# Patient Record
Sex: FEMALE | Race: WHITE | NOT HISPANIC OR LATINO
[De-identification: names, ages, dates, MRNs, and addresses within clinical notes are randomized per-mention and may not be internally consistent; named-entity substitution may affect disease eponyms.]

---

## 2017-05-17 ENCOUNTER — APPOINTMENT (OUTPATIENT)
Dept: MRI IMAGING | Facility: CLINIC | Age: 72
End: 2017-05-17

## 2017-05-17 ENCOUNTER — OUTPATIENT (OUTPATIENT)
Dept: OUTPATIENT SERVICES | Facility: HOSPITAL | Age: 72
LOS: 1 days | End: 2017-05-17
Payer: MEDICARE

## 2017-05-17 DIAGNOSIS — Z98.1 ARTHRODESIS STATUS: Chronic | ICD-10-CM

## 2017-05-17 DIAGNOSIS — Z98.89 OTHER SPECIFIED POSTPROCEDURAL STATES: Chronic | ICD-10-CM

## 2017-05-17 DIAGNOSIS — Z00.8 ENCOUNTER FOR OTHER GENERAL EXAMINATION: ICD-10-CM

## 2017-05-17 PROCEDURE — 73718 MRI LOWER EXTREMITY W/O DYE: CPT

## 2017-10-09 ENCOUNTER — RESULT REVIEW (OUTPATIENT)
Age: 72
End: 2017-10-09

## 2018-07-02 ENCOUNTER — APPOINTMENT (OUTPATIENT)
Dept: OBGYN | Facility: CLINIC | Age: 73
End: 2018-07-02
Payer: MEDICARE

## 2018-07-02 VITALS
WEIGHT: 133 LBS | BODY MASS INDEX: 22.83 KG/M2 | DIASTOLIC BLOOD PRESSURE: 93 MMHG | SYSTOLIC BLOOD PRESSURE: 172 MMHG | HEART RATE: 83 BPM

## 2018-07-02 DIAGNOSIS — N89.8 OTHER SPECIFIED NONINFLAMMATORY DISORDERS OF VAGINA: ICD-10-CM

## 2018-07-02 PROCEDURE — 51798 US URINE CAPACITY MEASURE: CPT

## 2018-07-02 PROCEDURE — 99214 OFFICE O/P EST MOD 30 MIN: CPT | Mod: 25

## 2018-07-02 RX ORDER — ESTRADIOL 0.1 MG/G
0.1 CREAM VAGINAL
Qty: 1 | Refills: 0 | Status: ACTIVE | COMMUNITY
Start: 2018-07-02 | End: 1900-01-01

## 2018-07-03 ENCOUNTER — APPOINTMENT (OUTPATIENT)
Dept: GASTROENTEROLOGY | Facility: CLINIC | Age: 73
End: 2018-07-03
Payer: MEDICARE

## 2018-07-03 VITALS
TEMPERATURE: 97.8 F | BODY MASS INDEX: 22.71 KG/M2 | SYSTOLIC BLOOD PRESSURE: 110 MMHG | HEART RATE: 77 BPM | HEIGHT: 64 IN | RESPIRATION RATE: 15 BRPM | OXYGEN SATURATION: 99 % | WEIGHT: 133 LBS | DIASTOLIC BLOOD PRESSURE: 70 MMHG

## 2018-07-03 DIAGNOSIS — R05 COUGH: ICD-10-CM

## 2018-07-03 DIAGNOSIS — Z12.11 ENCOUNTER FOR SCREENING FOR MALIGNANT NEOPLASM OF COLON: ICD-10-CM

## 2018-07-03 DIAGNOSIS — Z86.010 PERSONAL HISTORY OF COLONIC POLYPS: ICD-10-CM

## 2018-07-03 LAB
APPEARANCE: CLEAR
BACTERIA: NEGATIVE
BILIRUBIN URINE: NEGATIVE
BLOOD URINE: NEGATIVE
COLOR: YELLOW
GLUCOSE QUALITATIVE U: NEGATIVE MG/DL
HYALINE CASTS: 0 /LPF
KETONES URINE: NEGATIVE
LEUKOCYTE ESTERASE URINE: NEGATIVE
MICROSCOPIC-UA: NORMAL
NITRITE URINE: NEGATIVE
PH URINE: 6
PROTEIN URINE: NEGATIVE MG/DL
RED BLOOD CELLS URINE: 0 /HPF
SPECIFIC GRAVITY URINE: 1.01
SQUAMOUS EPITHELIAL CELLS: 1 /HPF
UROBILINOGEN URINE: NEGATIVE MG/DL
WHITE BLOOD CELLS URINE: 0 /HPF

## 2018-07-03 PROCEDURE — 99204 OFFICE O/P NEW MOD 45 MIN: CPT

## 2018-07-03 RX ORDER — POLYETHYLENE GLYCOL 3350, SODIUM SULFATE, SODIUM CHLORIDE, POTASSIUM CHLORIDE, ASCORBIC ACID, SODIUM ASCORBATE 7.5-2.691G
100 KIT ORAL
Qty: 1 | Refills: 0 | Status: ACTIVE | COMMUNITY
Start: 2018-07-03 | End: 1900-01-01

## 2018-07-04 LAB
BACTERIA UR CULT: NORMAL
CANDIDA VAG CYTO: NOT DETECTED
CORE LAB FLUID CYTOLOGY: NORMAL
G VAGINALIS+PREV SP MTYP VAG QL MICRO: NOT DETECTED
T VAGINALIS VAG QL WET PREP: NOT DETECTED

## 2018-07-05 ENCOUNTER — OTHER (OUTPATIENT)
Age: 73
End: 2018-07-05

## 2018-07-06 ENCOUNTER — OTHER (OUTPATIENT)
Age: 73
End: 2018-07-06

## 2018-07-06 RX ORDER — CALCIUM CITRATE/VITAMIN D3 250MG-5MCG
TABLET ORAL
Refills: 0 | Status: ACTIVE | COMMUNITY

## 2018-07-06 RX ORDER — ELECTROLYTES/DEXTROSE
SOLUTION, ORAL ORAL
Refills: 0 | Status: ACTIVE | COMMUNITY

## 2018-07-06 RX ORDER — BACLOFEN 10 MG/1
10 TABLET ORAL
Refills: 0 | Status: ACTIVE | COMMUNITY

## 2018-07-06 RX ORDER — BACILLUS COAGULANS/INULIN 1B-250 MG
CAPSULE ORAL
Refills: 0 | Status: ACTIVE | COMMUNITY

## 2018-07-06 RX ORDER — ASCORBIC ACID 125 MG
TABLET,CHEWABLE ORAL
Refills: 0 | Status: ACTIVE | COMMUNITY

## 2018-07-06 RX ORDER — PEPPERMINT OIL
OIL (ML) MISCELLANEOUS
Refills: 0 | Status: ACTIVE | COMMUNITY

## 2018-07-06 RX ORDER — PASSION FLOWER 250 MG
CAPSULE ORAL
Refills: 0 | Status: ACTIVE | COMMUNITY

## 2018-07-06 RX ORDER — CHROMIUM 200 MCG
TABLET ORAL
Refills: 0 | Status: ACTIVE | COMMUNITY

## 2018-07-17 ENCOUNTER — TRANSCRIPTION ENCOUNTER (OUTPATIENT)
Age: 73
End: 2018-07-17

## 2018-07-30 ENCOUNTER — APPOINTMENT (OUTPATIENT)
Dept: OBGYN | Facility: CLINIC | Age: 73
End: 2018-07-30
Payer: MEDICARE

## 2018-07-30 VITALS
BODY MASS INDEX: 22.53 KG/M2 | WEIGHT: 132 LBS | DIASTOLIC BLOOD PRESSURE: 80 MMHG | HEIGHT: 64 IN | HEART RATE: 81 BPM | SYSTOLIC BLOOD PRESSURE: 135 MMHG

## 2018-07-30 PROCEDURE — 99214 OFFICE O/P EST MOD 30 MIN: CPT

## 2018-07-31 LAB
APPEARANCE: CLEAR
BACTERIA: NEGATIVE
BILIRUBIN URINE: NEGATIVE
BLOOD URINE: NEGATIVE
COLOR: YELLOW
CORE LAB FLUID CYTOLOGY: NORMAL
ESTRADIOL SERPL-MCNC: <5 PG/ML
GLUCOSE QUALITATIVE U: NEGATIVE MG/DL
HYALINE CASTS: 1 /LPF
KETONES URINE: NEGATIVE
LEUKOCYTE ESTERASE URINE: NEGATIVE
MICROSCOPIC-UA: NORMAL
NITRITE URINE: NEGATIVE
PH URINE: 6.5
PROTEIN URINE: NEGATIVE MG/DL
RED BLOOD CELLS URINE: 1 /HPF
SPECIFIC GRAVITY URINE: 1.01
SQUAMOUS EPITHELIAL CELLS: 1 /HPF
UROBILINOGEN URINE: NEGATIVE MG/DL
WHITE BLOOD CELLS URINE: 0 /HPF

## 2018-08-01 LAB — BACTERIA UR CULT: NORMAL

## 2018-09-06 ENCOUNTER — APPOINTMENT (OUTPATIENT)
Dept: GASTROENTEROLOGY | Facility: HOSPITAL | Age: 73
End: 2018-09-06

## 2018-09-06 ENCOUNTER — OUTPATIENT (OUTPATIENT)
Dept: OUTPATIENT SERVICES | Facility: HOSPITAL | Age: 73
LOS: 1 days | Discharge: HOME | End: 2018-09-06
Payer: MEDICARE

## 2018-09-06 DIAGNOSIS — Z98.89 OTHER SPECIFIED POSTPROCEDURAL STATES: Chronic | ICD-10-CM

## 2018-09-06 DIAGNOSIS — Z98.1 ARTHRODESIS STATUS: Chronic | ICD-10-CM

## 2018-09-06 DIAGNOSIS — Z12.11 ENCOUNTER FOR SCREENING FOR MALIGNANT NEOPLASM OF COLON: ICD-10-CM

## 2018-09-06 PROCEDURE — 45378 DIAGNOSTIC COLONOSCOPY: CPT | Mod: GC

## 2019-02-04 ENCOUNTER — APPOINTMENT (OUTPATIENT)
Dept: OBGYN | Facility: CLINIC | Age: 74
End: 2019-02-04
Payer: MEDICARE

## 2019-02-04 VITALS
SYSTOLIC BLOOD PRESSURE: 146 MMHG | HEART RATE: 84 BPM | DIASTOLIC BLOOD PRESSURE: 77 MMHG | BODY MASS INDEX: 22.53 KG/M2 | HEIGHT: 64 IN | WEIGHT: 132 LBS

## 2019-02-04 PROCEDURE — 99214 OFFICE O/P EST MOD 30 MIN: CPT | Mod: 25

## 2019-02-04 PROCEDURE — 51798 US URINE CAPACITY MEASURE: CPT

## 2019-02-04 NOTE — COUNSELING
[FreeTextEntry1] : #1 vulvar burning/pain c/w friction marks from intercourse (if resolves with no intercourse til next visit -4-6 weeks-with estrace 1/4 gm 3x week at bedtime-if not will pursue further-to use on finger only not inserted-segundo next vist-also lubrication when resumes intercourse (2-3 weeks); cont vagifem-(mammo neg 2018 and breast sono) see dr bert whitley (br surgeon) and radiologist (butch) and d/cd by dr suárez (med onc)-diagnosed br ca 2010 and TL-will d/c estrace at next visit. Rxd vagifem by Dr. Hough and has not seen her in years-will inform gyn ostroff and breast mds as well and will get note from her breast MDS that vagifem/estrace is benefit>risk; will see dr whitley next week (breast md) and get note; also triple paste to vulva in am; benefit outweighs risk for pt re estrace cream small dose, short interval being rxd for severe vulvar burning-will change to otc once vulvar irritation is improved. lubrication for intercourse advised.-today 7/30/18 feels better and spoke to Dr. Bert Whitley Breast surgeon [facility] who states she had ER+ non-metastatic breast ca and she may take topical (Vagifem and estrace each 2x week) and estradiol level drawn today-I spoke to him directly and he willi send me an email approving this (2/4/19)  pt discharged from  Dr. Suárez med onc (9 y 5/19) ; vulva much improved: 2/4/19 no further burning feels well, takes vagifem 2 x week and approved by Dr. Whitley ([phone]) to take ; e2 <5 on vagifem repeated at next blood draw (at dr chaudhary-message sent to her)\par #2 gyn Dr. Neeru Barrera-up to date with exams seeing her again next year-RECORDS REQ AGAIN\par #3 thyroid nodule removed-benign; still has cough will ck with pcp (told gerd..?)will ck last cxr ; also sees pulmonary md at [facility]-dr eleazar covington-I did gyn exam and pap today \par #4 dexa last done 2017-osteoporosis--2.5 will ck in 2019 and decide if med-does exercise and takes d3 and calcium and will see Dr. Riojas bone mineral spec endo will see her aug 2018-not seeing any longer now dr chaudhary/\par #5 colo 2018 pending DR Henriquez normal no polyp (Polyp x 1 in past) rpt 2023 or per dr henriquez\par #6 sono pelvis ordered rx given\par #7 affirm (due to vag d/c) negative 2018\par #8 pcp twyla irwin all up to date\par #9 all ques ans to pt apparent satisfaction\par #10 may rx ptns for oab component of udfr/oz from pop; booked 2/22 x 4 weeks\par #11pap, hpv taken\par #12 taking 25 mcg synthroid w/dr chaudhary\par #13 bi annual 8/5/19 booked\par #14 has 'M' spike in blood and sees dr Knott q6 mos to ck heme/onc re marker for multiple myeloma\par

## 2019-02-04 NOTE — HISTORY OF PRESENT ILLNESS
[Cystocele (Obstetric)] : none [Uterine Prolapse] : none [Vaginal Wall Prolapse] : none [Rectal Prolapse] : none [Stress Incontinence] : none [Urge Incontinence Of Urine] : none [Unable To Restrain Bowel Movement] : none [x1] : once nightly [Urinary Stream Starts And Stops] : none [Incomplete Emptying Of Bladder] : none [Urinary Frequency] : none [Feelings Of Urinary Urgency] : none [Pain During Urination (Dysuria)] : none [Urinary Tract Infection] : rare [Hematuria] : none [Constipation Obstructed Defecation] : none [Incomplete Emptying Of Stool] : none [Stool Visible Blood] : none [Diarrhea] : none [Pelvic Pain] : frequent [Vaginal Pain] : rare [Vulvar Pain] : daily [Bladder Pain] : none [Rectal Pain] : rare [Back Pain] : rare [Sexual Dysfunction, NOS] : frequent [] : none

## 2019-02-04 NOTE — PHYSICAL EXAM
[No Acute Distress] : in no acute distress [Well developed] : well developed [Well Nourished] : ~L well nourished [Good Hygeine] : demonstrates good hygeine [Oriented x3] : oriented to person, place, and time [Normal Memory] : ~T memory was ~L unimpaired [Normal Mood/Affect] : mood and affect are normal [Normal Lung Sounds] : the lungs were clear to auscultation [Respirations regular] : ~T respiratory rate was regular [Rate & Rhythm Regular] : ~T heart rate and rhythm were normal [No Edema] : ~T edema was not present [Supple] : ~T the neck demonstrated no ~M decrease in suppleness [Thyroid Normal] : the thyroid ~T showed no abnormalities [Symmetrical] : the neck was ~L symmetrical [Warm and Dry] : was warm and dry to touch [Turgor Normal] : skin turgor ~T was normal [Normal Gait] : gait was normal [No Joint Swelling] : there was no joint swelling [No Clubbing, Cyanosis] : no clubbing or cyanosis of the fingernails [Normal Strength/Tone] : muscle strength and tone were normal [Vulvar Atrophy] : vulvar atrophy [Vulvar Scarring] : vulvar scarring [Labia Majora] : were normal [Labia Majora Erythema] : erythema [Labia Minora] : were normal [Labia Minora Erythema] : erythema [Bartholin's Gland] : both Bartholin's glands were normal  [Normal Appearance] : general appearance was normal [Atrophy] : atrophy [4] : 4 [] : I [Uterine Adnexae] : were not tender and not enlarged [Normal rectal exam] : was normal [Normal] : was normal [None] : no [Mass] : no breast mass [Tender] : no tenderness [Nipple Discharge] : no nipple discharge [Mass (___ Cm)] : no ~M [unfilled] abdominal mass was palpated [Tenderness] : ~T no ~M abdominal tenderness observed [H/Smegaly] : no hepatosplenomegaly [Supraclavicular LAD] : no adenopathy noted in supraclavicular lymph nodes [Axillary LAD] : no adenopathy was noted in axillary nodes [Inguinal LAD] : no adenopathy was noted in the inguinal lymph nodes [Rash/Lesion] : no rash or lesion was noted [Estrogen Effect] : no estrogen effect was observed

## 2019-02-04 NOTE — REVIEW OF SYSTEMS
[Eyesight Problems] : eyesight problems [Anxiety] : anxiety [All Other ROS] : all other reviewed systems are negative

## 2019-02-04 NOTE — CHIEF COMPLAINT
[Questionnaire Received] : Patient questionnaire received [Other ___] : [unfilled] [Bladder Pain] : bladder pain

## 2019-02-05 LAB
APPEARANCE: CLEAR
BACTERIA: NEGATIVE
BILIRUBIN URINE: NEGATIVE
BLOOD URINE: NEGATIVE
COLOR: YELLOW
GLUCOSE QUALITATIVE U: NEGATIVE MG/DL
KETONES URINE: NEGATIVE
LEUKOCYTE ESTERASE URINE: NEGATIVE
MICROSCOPIC-UA: NORMAL
NITRITE URINE: NEGATIVE
PH URINE: 6.5
PROTEIN URINE: NEGATIVE MG/DL
RED BLOOD CELLS URINE: 1 /HPF
SPECIFIC GRAVITY URINE: 1.01
SQUAMOUS EPITHELIAL CELLS: 0 /HPF
UROBILINOGEN URINE: NEGATIVE MG/DL
WHITE BLOOD CELLS URINE: 0 /HPF

## 2019-02-06 LAB
BACTERIA UR CULT: NORMAL
HPV HIGH+LOW RISK DNA PNL CVX: NOT DETECTED

## 2019-02-10 LAB — CYTOLOGY CVX/VAG DOC THIN PREP: NORMAL

## 2019-02-13 ENCOUNTER — OTHER (OUTPATIENT)
Age: 74
End: 2019-02-13

## 2019-02-22 ENCOUNTER — APPOINTMENT (OUTPATIENT)
Dept: OBGYN | Facility: CLINIC | Age: 74
End: 2019-02-22
Payer: MEDICARE

## 2019-02-22 VITALS
DIASTOLIC BLOOD PRESSURE: 77 MMHG | SYSTOLIC BLOOD PRESSURE: 146 MMHG | HEIGHT: 64 IN | HEART RATE: 72 BPM | WEIGHT: 133 LBS | BODY MASS INDEX: 22.71 KG/M2

## 2019-02-22 PROCEDURE — 99213 OFFICE O/P EST LOW 20 MIN: CPT | Mod: 25

## 2019-02-22 PROCEDURE — 64566 NEUROELTRD STIM POST TIBIAL: CPT

## 2019-03-01 ENCOUNTER — APPOINTMENT (OUTPATIENT)
Dept: OBGYN | Facility: CLINIC | Age: 74
End: 2019-03-01
Payer: MEDICARE

## 2019-03-01 VITALS
HEIGHT: 64 IN | BODY MASS INDEX: 22.88 KG/M2 | DIASTOLIC BLOOD PRESSURE: 73 MMHG | WEIGHT: 134 LBS | SYSTOLIC BLOOD PRESSURE: 121 MMHG | HEART RATE: 93 BPM

## 2019-03-01 PROCEDURE — 99214 OFFICE O/P EST MOD 30 MIN: CPT | Mod: 25

## 2019-03-01 PROCEDURE — 64566 NEUROELTRD STIM POST TIBIAL: CPT

## 2019-03-07 ENCOUNTER — APPOINTMENT (OUTPATIENT)
Dept: OPHTHALMOLOGY | Facility: CLINIC | Age: 74
End: 2019-03-07
Payer: MEDICARE

## 2019-03-07 DIAGNOSIS — H18.51 ENDOTHELIAL CORNEAL DYSTROPHY: ICD-10-CM

## 2019-03-07 DIAGNOSIS — H52.213 IRREGULAR ASTIGMATISM, BILATERAL: ICD-10-CM

## 2019-03-07 DIAGNOSIS — H53.141 VISUAL DISCOMFORT, RIGHT EYE: ICD-10-CM

## 2019-03-07 DIAGNOSIS — Z86.69 PERSONAL HISTORY OF OTHER DISEASES OF THE NERVOUS SYSTEM AND SENSE ORGANS: ICD-10-CM

## 2019-03-07 DIAGNOSIS — Z98.890 OTHER SPECIFIED POSTPROCEDURAL STATES: ICD-10-CM

## 2019-03-07 DIAGNOSIS — Z96.1 PRESENCE OF INTRAOCULAR LENS: ICD-10-CM

## 2019-03-07 DIAGNOSIS — H53.10 UNSPECIFIED SUBJECTIVE VISUAL DISTURBANCES: ICD-10-CM

## 2019-03-07 PROCEDURE — 76514 ECHO EXAM OF EYE THICKNESS: CPT

## 2019-03-07 PROCEDURE — 92025 CPTRIZED CORNEAL TOPOGRAPHY: CPT

## 2019-03-07 PROCEDURE — 92286 ANT SGM IMG I&R SPECLR MIC: CPT

## 2019-03-07 PROCEDURE — 92004 COMPRE OPH EXAM NEW PT 1/>: CPT

## 2019-03-08 ENCOUNTER — TRANSCRIPTION ENCOUNTER (OUTPATIENT)
Age: 74
End: 2019-03-08

## 2019-03-08 ENCOUNTER — APPOINTMENT (OUTPATIENT)
Dept: OBGYN | Facility: CLINIC | Age: 74
End: 2019-03-08
Payer: MEDICARE

## 2019-03-08 VITALS
HEART RATE: 94 BPM | SYSTOLIC BLOOD PRESSURE: 105 MMHG | DIASTOLIC BLOOD PRESSURE: 69 MMHG | WEIGHT: 134 LBS | BODY MASS INDEX: 22.88 KG/M2 | HEIGHT: 64 IN

## 2019-03-08 PROCEDURE — 64566 NEUROELTRD STIM POST TIBIAL: CPT

## 2019-03-08 PROCEDURE — 99214 OFFICE O/P EST MOD 30 MIN: CPT | Mod: 25

## 2019-03-11 ENCOUNTER — APPOINTMENT (OUTPATIENT)
Dept: OPHTHALMOLOGY | Facility: CLINIC | Age: 74
End: 2019-03-11
Payer: SELF-PAY

## 2019-03-11 PROCEDURE — 92015A: CUSTOM

## 2019-03-11 PROCEDURE — 92015 DETERMINE REFRACTIVE STATE: CPT

## 2019-03-15 ENCOUNTER — APPOINTMENT (OUTPATIENT)
Dept: OBGYN | Facility: CLINIC | Age: 74
End: 2019-03-15
Payer: MEDICARE

## 2019-03-15 VITALS
HEART RATE: 80 BPM | DIASTOLIC BLOOD PRESSURE: 74 MMHG | SYSTOLIC BLOOD PRESSURE: 114 MMHG | WEIGHT: 134 LBS | BODY MASS INDEX: 22.88 KG/M2 | HEIGHT: 64 IN

## 2019-03-15 PROCEDURE — 64566 NEUROELTRD STIM POST TIBIAL: CPT

## 2019-03-15 PROCEDURE — 99214 OFFICE O/P EST MOD 30 MIN: CPT | Mod: 25

## 2019-03-22 ENCOUNTER — APPOINTMENT (OUTPATIENT)
Dept: OBGYN | Facility: CLINIC | Age: 74
End: 2019-03-22
Payer: MEDICARE

## 2019-03-22 VITALS
HEIGHT: 64 IN | WEIGHT: 134 LBS | HEART RATE: 79 BPM | DIASTOLIC BLOOD PRESSURE: 84 MMHG | BODY MASS INDEX: 22.88 KG/M2 | SYSTOLIC BLOOD PRESSURE: 127 MMHG

## 2019-03-22 DIAGNOSIS — R30.0 DYSURIA: ICD-10-CM

## 2019-03-22 PROCEDURE — 64566 NEUROELTRD STIM POST TIBIAL: CPT

## 2019-03-22 PROCEDURE — 51798 US URINE CAPACITY MEASURE: CPT

## 2019-03-22 PROCEDURE — 99214 OFFICE O/P EST MOD 30 MIN: CPT | Mod: 25

## 2019-04-03 ENCOUNTER — APPOINTMENT (OUTPATIENT)
Dept: OPHTHALMOLOGY | Facility: CLINIC | Age: 74
End: 2019-04-03
Payer: SELF-PAY

## 2019-04-03 PROCEDURE — 92310C: CUSTOM

## 2019-04-05 ENCOUNTER — APPOINTMENT (OUTPATIENT)
Dept: OBGYN | Facility: CLINIC | Age: 74
End: 2019-04-05
Payer: MEDICARE

## 2019-04-05 VITALS
SYSTOLIC BLOOD PRESSURE: 143 MMHG | HEART RATE: 76 BPM | HEIGHT: 64 IN | BODY MASS INDEX: 23.39 KG/M2 | WEIGHT: 137 LBS | DIASTOLIC BLOOD PRESSURE: 79 MMHG

## 2019-04-05 PROCEDURE — 99214 OFFICE O/P EST MOD 30 MIN: CPT | Mod: 25

## 2019-04-05 PROCEDURE — 64566 NEUROELTRD STIM POST TIBIAL: CPT

## 2019-04-12 ENCOUNTER — APPOINTMENT (OUTPATIENT)
Dept: OBGYN | Facility: CLINIC | Age: 74
End: 2019-04-12
Payer: MEDICARE

## 2019-04-12 VITALS
HEIGHT: 64 IN | BODY MASS INDEX: 23.22 KG/M2 | SYSTOLIC BLOOD PRESSURE: 134 MMHG | DIASTOLIC BLOOD PRESSURE: 80 MMHG | WEIGHT: 136 LBS | HEART RATE: 73 BPM

## 2019-04-12 DIAGNOSIS — N94.89 OTHER SPECIFIED CONDITIONS ASSOCIATED WITH FEMALE GENITAL ORGANS AND MENSTRUAL CYCLE: ICD-10-CM

## 2019-04-12 PROCEDURE — 64566 NEUROELTRD STIM POST TIBIAL: CPT

## 2019-04-12 PROCEDURE — 51798 US URINE CAPACITY MEASURE: CPT

## 2019-04-12 PROCEDURE — 99214 OFFICE O/P EST MOD 30 MIN: CPT | Mod: 25

## 2019-04-19 ENCOUNTER — APPOINTMENT (OUTPATIENT)
Dept: OBGYN | Facility: CLINIC | Age: 74
End: 2019-04-19
Payer: MEDICARE

## 2019-04-19 VITALS
HEART RATE: 72 BPM | BODY MASS INDEX: 23.22 KG/M2 | SYSTOLIC BLOOD PRESSURE: 123 MMHG | HEIGHT: 64 IN | WEIGHT: 136 LBS | DIASTOLIC BLOOD PRESSURE: 77 MMHG

## 2019-04-19 PROCEDURE — 99214 OFFICE O/P EST MOD 30 MIN: CPT | Mod: 25

## 2019-04-19 PROCEDURE — 51798 US URINE CAPACITY MEASURE: CPT

## 2019-04-19 PROCEDURE — 64566 NEUROELTRD STIM POST TIBIAL: CPT

## 2019-04-26 ENCOUNTER — APPOINTMENT (OUTPATIENT)
Dept: OBGYN | Facility: CLINIC | Age: 74
End: 2019-04-26
Payer: MEDICARE

## 2019-04-26 VITALS
HEIGHT: 64 IN | DIASTOLIC BLOOD PRESSURE: 73 MMHG | SYSTOLIC BLOOD PRESSURE: 137 MMHG | BODY MASS INDEX: 22.88 KG/M2 | WEIGHT: 134 LBS | HEART RATE: 80 BPM

## 2019-04-26 PROCEDURE — 64566 NEUROELTRD STIM POST TIBIAL: CPT

## 2019-04-26 PROCEDURE — 51798 US URINE CAPACITY MEASURE: CPT

## 2019-04-26 PROCEDURE — 99214 OFFICE O/P EST MOD 30 MIN: CPT | Mod: 25

## 2019-04-26 RX ORDER — FLUTICASONE PROPIONATE 50 UG/1
50 SPRAY, METERED NASAL
Qty: 16 | Refills: 0 | Status: ACTIVE | COMMUNITY
Start: 2019-01-09

## 2019-04-26 RX ORDER — LEVOTHYROXINE SODIUM 0.05 MG/1
50 TABLET ORAL
Qty: 90 | Refills: 0 | Status: ACTIVE | COMMUNITY
Start: 2019-04-08

## 2019-04-26 RX ORDER — LEVOTHYROXINE SODIUM 25 UG/1
25 TABLET ORAL
Qty: 90 | Refills: 0 | Status: ACTIVE | COMMUNITY
Start: 2019-01-07

## 2019-04-26 RX ORDER — LOTEPREDNOL ETABONATE 5 MG/G
0.5 GEL OPHTHALMIC
Qty: 5 | Refills: 0 | Status: ACTIVE | COMMUNITY
Start: 2019-02-12

## 2019-04-30 ENCOUNTER — APPOINTMENT (OUTPATIENT)
Dept: OPHTHALMOLOGY | Facility: CLINIC | Age: 74
End: 2019-04-30
Payer: MEDICARE

## 2019-04-30 PROCEDURE — 92012 INTRM OPH EXAM EST PATIENT: CPT

## 2019-04-30 PROCEDURE — 92060 SENSORIMOTOR EXAMINATION: CPT

## 2019-05-03 ENCOUNTER — APPOINTMENT (OUTPATIENT)
Dept: OBGYN | Facility: CLINIC | Age: 74
End: 2019-05-03
Payer: MEDICARE

## 2019-05-03 VITALS
BODY MASS INDEX: 23.39 KG/M2 | DIASTOLIC BLOOD PRESSURE: 84 MMHG | SYSTOLIC BLOOD PRESSURE: 156 MMHG | HEIGHT: 64 IN | WEIGHT: 137 LBS | HEART RATE: 72 BPM

## 2019-05-03 PROCEDURE — 99213 OFFICE O/P EST LOW 20 MIN: CPT | Mod: 25

## 2019-05-03 PROCEDURE — 51798 US URINE CAPACITY MEASURE: CPT

## 2019-05-03 PROCEDURE — 64566 NEUROELTRD STIM POST TIBIAL: CPT

## 2019-05-16 ENCOUNTER — OTHER (OUTPATIENT)
Age: 74
End: 2019-05-16

## 2019-05-17 ENCOUNTER — APPOINTMENT (OUTPATIENT)
Dept: OBGYN | Facility: CLINIC | Age: 74
End: 2019-05-17
Payer: MEDICARE

## 2019-05-17 VITALS
HEART RATE: 90 BPM | HEIGHT: 64 IN | DIASTOLIC BLOOD PRESSURE: 79 MMHG | BODY MASS INDEX: 23.22 KG/M2 | SYSTOLIC BLOOD PRESSURE: 159 MMHG | WEIGHT: 136 LBS

## 2019-05-17 PROCEDURE — 64566 NEUROELTRD STIM POST TIBIAL: CPT

## 2019-05-24 ENCOUNTER — APPOINTMENT (OUTPATIENT)
Dept: OBGYN | Facility: CLINIC | Age: 74
End: 2019-05-24

## 2019-05-25 NOTE — HISTORY OF PRESENT ILLNESS
[Cystocele (Obstetric)] : none [Uterine Prolapse] : none [Vaginal Wall Prolapse] : none [Rectal Prolapse] : none [Stress Incontinence] : none [Urge Incontinence Of Urine] : none [Unable To Restrain Bowel Movement] : none [x1] : once nightly [Urinary Stream Starts And Stops] : none [Urinary Frequency] : none [Incomplete Emptying Of Bladder] : none [Feelings Of Urinary Urgency] : none [Pain During Urination (Dysuria)] : none [Urinary Tract Infection] : rare [Hematuria] : none [Constipation Obstructed Defecation] : none [Stool Visible Blood] : none [Incomplete Emptying Of Stool] : none [Pelvic Pain] : frequent [Diarrhea] : none [Vaginal Pain] : rare [Vulvar Pain] : daily [Rectal Pain] : rare [Back Pain] : rare [Bladder Pain] : none [Sexual Dysfunction, NOS] : frequent [] : none

## 2019-05-31 ENCOUNTER — APPOINTMENT (OUTPATIENT)
Dept: OBGYN | Facility: CLINIC | Age: 74
End: 2019-05-31
Payer: MEDICARE

## 2019-05-31 VITALS
WEIGHT: 136 LBS | DIASTOLIC BLOOD PRESSURE: 80 MMHG | HEIGHT: 64 IN | HEART RATE: 88 BPM | BODY MASS INDEX: 23.22 KG/M2 | SYSTOLIC BLOOD PRESSURE: 128 MMHG

## 2019-05-31 PROCEDURE — 99214 OFFICE O/P EST MOD 30 MIN: CPT | Mod: 25

## 2019-05-31 PROCEDURE — 51798 US URINE CAPACITY MEASURE: CPT

## 2019-05-31 PROCEDURE — 64566 NEUROELTRD STIM POST TIBIAL: CPT

## 2019-05-31 NOTE — PROCEDURE
[Unable to tolerate drug therapy] : because she is unable to tolerate drug therapy [Urgency] : urinary urgency [Frequency] : urinary frequency [Right Ankle] : right ankle [#12] : treatment #12 [Patient Tolerated Treatment] : patient tolerated treatment well [Appropriate Reflexes Elicited] : appropriate reflexes were elicited [Treatment cycle completed] : treatment cycle was completed [Adverse Reactions] : no adverse reactions, [FreeTextEntry1] : pt tolerated well rtc one month; sl < UUI; right ankle #4 then raised to #5

## 2019-05-31 NOTE — PHYSICAL EXAM
[Well developed] : well developed [No Acute Distress] : in no acute distress [Well Nourished] : ~L well nourished [Good Hygeine] : demonstrates good hygeine [Oriented x3] : oriented to person, place, and time [Normal Mood/Affect] : mood and affect are normal [Normal Memory] : ~T memory was ~L unimpaired [Respirations regular] : ~T respiratory rate was regular [Rate & Rhythm Regular] : ~T heart rate and rhythm were normal [Normal Lung Sounds] : the lungs were clear to auscultation [No Edema] : ~T edema was not present [Supple] : ~T the neck demonstrated no ~M decrease in suppleness [Thyroid Normal] : the thyroid ~T showed no abnormalities [Symmetrical] : the neck was ~L symmetrical [Warm and Dry] : was warm and dry to touch [Turgor Normal] : skin turgor ~T was normal [Normal Gait] : gait was normal [No Joint Swelling] : there was no joint swelling [No Clubbing, Cyanosis] : no clubbing or cyanosis of the fingernails [Normal Strength/Tone] : muscle strength and tone were normal [Vulvar Atrophy] : vulvar atrophy [Vulvar Scarring] : vulvar scarring [Labia Majora Erythema] : erythema [Labia Majora] : were normal [Labia Minora] : were normal [Labia Minora Erythema] : erythema [Bartholin's Gland] : both Bartholin's glands were normal  [Normal Appearance] : general appearance was normal [Atrophy] : atrophy [4] : 4 [] : I [Uterine Adnexae] : were not tender and not enlarged [Normal rectal exam] : was normal [Normal] : was normal [None] : no [Mass] : no breast mass [Tender] : no tenderness [Nipple Discharge] : no nipple discharge [Mass (___ Cm)] : no ~M [unfilled] abdominal mass was palpated [Tenderness] : ~T no ~M abdominal tenderness observed [H/Smegaly] : no hepatosplenomegaly [Supraclavicular LAD] : no adenopathy noted in supraclavicular lymph nodes [Axillary LAD] : no adenopathy was noted in axillary nodes [Inguinal LAD] : no adenopathy was noted in the inguinal lymph nodes [Rash/Lesion] : no rash or lesion was noted [Estrogen Effect] : no estrogen effect was observed

## 2019-05-31 NOTE — COUNSELING
[FreeTextEntry1] : #1 vulvar burning/pain c/w friction marks from intercourse (if resolves with no intercourse til next visit -4-6 weeks-with estrace 1/4 gm 3x week at bedtime-if not will pursue further-to use on finger only not inserted-segundo next vist-also lubrication when resumes intercourse (2-3 weeks); cont vagifem-(mammo neg 2018 and breast sono) see dr bert whitley (br surgeon) and radiologist (butch) and d/cd by dr suárez (med onc)-diagnosed br ca 2010 and TL-will d/c estrace at next visit. Rxd vagifem by Dr. Hough and has not seen her in years-will inform gyn ostroff and breast mds as well and will get note from her breast MDS that vagifem/estrace is benefit>risk; will see dr whitley next week (breast md) and get note; also triple paste to vulva in am; benefit outweighs risk for pt re estrace cream small dose, short interval being rxd for severe vulvar burning-will change to otc once vulvar irritation is improved. lubrication for intercourse advised.-today 7/30/18 feels better and spoke to Dr. Bert Whitley Breast surgeon [facility] who states she had ER+ non-metastatic breast ca and she may take topical (Vagifem and estrace each 2x week) and estradiol level drawn today-I spoke to him directly and he willi send me an email approving this (2/4/19)  pt discharged from  Dr. Suárez med onc (9 y 5/19) ; vulva much improved: 2/4/19 no further burning feels well, takes vagifem 2 x week and approved by Dr. Whitley ([id number]) to take ; e2 <5 on vagifem repeated at next blood draw (at dr chaudhary-message sent to her)-now only vagifem 2x week no estrace \par #2 gyn Dr. Neeru Barrera-up to date with exams seeing her again next year-RECORDS REQ AGAIN\par #3 thyroid nodule removed-benign; still has cough will ck with pcp (told gerd..?)will ck last cxr ; also sees pulmonary md at [facility]-dr peter dicpiniagatis-I did gyn exam and pap 2019 negative\par #4 dexa last done 2017-osteoporosis--2.5 will ck in 2019 and decide if med-does exercise and takes d3 and calcium and will see Dr. Riojas bone mineral spec endo will see her aug 2018-not seeing any longer now dr chaudhary/ is following\par #5 colo 2018 pending DR Henriquez normal no polyp (Polyp x 1 in past) rpt 2023 or per dr henriquez\par #6 sono pelvis normal may 2019-ro not visualized due to bowel gas-rpt as clinially necessary-el nl 1mm, LO nl\par #7 affirm (due to vag d/c) negative 2018 no d/c or sx at present\par #8 pcp twyla irwin all up to date\par #9 all ques ans to pt apparent satisfaction\par #10 ptns for oab component of udfr/oz from pop; booked 2/22 x 4 weeks-rx #12 given today and other visits set up\par #11pap, hpv taken normal\par #12 taking 50 mcg synthroid w/dr chaudhary also see above\par #13 bi annual 8/5/19 booked\par #14 has 'M' spike in blood and sees dr Knott q6 mos to ck heme/onc re marker for multiple myeloma (3/25)\par #15 pt having eye issues ref to Dr. Derian Correia if she needs another opinion\par #16  [facility] cytol sent pvr 0 cc\par

## 2019-06-28 ENCOUNTER — APPOINTMENT (OUTPATIENT)
Dept: OBGYN | Facility: CLINIC | Age: 74
End: 2019-06-28
Payer: MEDICARE

## 2019-06-28 VITALS
HEIGHT: 64 IN | SYSTOLIC BLOOD PRESSURE: 115 MMHG | HEART RATE: 80 BPM | DIASTOLIC BLOOD PRESSURE: 69 MMHG | WEIGHT: 136 LBS | BODY MASS INDEX: 23.22 KG/M2

## 2019-06-28 PROCEDURE — 64566 NEUROELTRD STIM POST TIBIAL: CPT

## 2019-06-28 PROCEDURE — 99214 OFFICE O/P EST MOD 30 MIN: CPT | Mod: 25

## 2019-07-26 ENCOUNTER — APPOINTMENT (OUTPATIENT)
Dept: OBGYN | Facility: CLINIC | Age: 74
End: 2019-07-26
Payer: MEDICARE

## 2019-07-26 VITALS
WEIGHT: 136 LBS | BODY MASS INDEX: 23.22 KG/M2 | SYSTOLIC BLOOD PRESSURE: 113 MMHG | HEART RATE: 99 BPM | DIASTOLIC BLOOD PRESSURE: 72 MMHG | HEIGHT: 64 IN

## 2019-07-26 PROCEDURE — 51798 US URINE CAPACITY MEASURE: CPT

## 2019-07-26 PROCEDURE — 99214 OFFICE O/P EST MOD 30 MIN: CPT | Mod: 25

## 2019-07-26 PROCEDURE — 64566 NEUROELTRD STIM POST TIBIAL: CPT

## 2019-07-28 LAB
APPEARANCE: CLEAR
BACTERIA UR CULT: NORMAL
BACTERIA: NEGATIVE
BILIRUBIN URINE: NEGATIVE
BLOOD URINE: NEGATIVE
COLOR: NORMAL
GLUCOSE QUALITATIVE U: NEGATIVE
HYALINE CASTS: 0 /LPF
KETONES URINE: NEGATIVE
LEUKOCYTE ESTERASE URINE: NEGATIVE
MICROSCOPIC-UA: NORMAL
NITRITE URINE: NEGATIVE
PH URINE: 7
PROTEIN URINE: NEGATIVE
RED BLOOD CELLS URINE: 1 /HPF
SPECIFIC GRAVITY URINE: 1.02
SQUAMOUS EPITHELIAL CELLS: 0 /HPF
UROBILINOGEN URINE: NORMAL
WHITE BLOOD CELLS URINE: 0 /HPF

## 2019-08-01 LAB — URINE CYTOLOGY: NORMAL

## 2019-08-05 ENCOUNTER — APPOINTMENT (OUTPATIENT)
Dept: OBGYN | Facility: CLINIC | Age: 74
End: 2019-08-05

## 2019-08-23 ENCOUNTER — APPOINTMENT (OUTPATIENT)
Dept: OBGYN | Facility: CLINIC | Age: 74
End: 2019-08-23
Payer: MEDICARE

## 2019-08-23 VITALS
BODY MASS INDEX: 23.22 KG/M2 | HEIGHT: 64 IN | WEIGHT: 136 LBS | HEART RATE: 88 BPM | DIASTOLIC BLOOD PRESSURE: 79 MMHG | SYSTOLIC BLOOD PRESSURE: 131 MMHG

## 2019-08-23 PROCEDURE — 51798 US URINE CAPACITY MEASURE: CPT

## 2019-08-23 PROCEDURE — 99214 OFFICE O/P EST MOD 30 MIN: CPT | Mod: 25

## 2019-08-23 PROCEDURE — 64566 NEUROELTRD STIM POST TIBIAL: CPT

## 2019-08-23 NOTE — HISTORY OF PRESENT ILLNESS
[Cystocele (Obstetric)] : none [Uterine Prolapse] : none [Vaginal Wall Prolapse] : none [Rectal Prolapse] : none [Stress Incontinence] : none [Urge Incontinence Of Urine] : none [Unable To Restrain Bowel Movement] : none [x1] : once nightly [Urinary Stream Starts And Stops] : none [Incomplete Emptying Of Bladder] : none [Urinary Frequency] : none [Feelings Of Urinary Urgency] : none [Pain During Urination (Dysuria)] : none [Urinary Tract Infection] : rare [Hematuria] : none [Constipation Obstructed Defecation] : none [Incomplete Emptying Of Stool] : none [Stool Visible Blood] : none [Diarrhea] : none [Pelvic Pain] : frequent [Vaginal Pain] : rare [Bladder Pain] : none [Vulvar Pain] : daily [Rectal Pain] : rare [Back Pain] : rare [Sexual Dysfunction, NOS] : frequent [] : none

## 2019-08-23 NOTE — COUNSELING
[FreeTextEntry1] : #1 vulvar burning/pain c/w friction marks from intercourse (if resolves with no intercourse til next visit -4-6 weeks-with estrace 1/4 gm 3x week at bedtime-if not will pursue further-to use on finger only not inserted-seugndo next vist-also lubrication when resumes intercourse (2-3 weeks); cont vagifem-(mammo neg 2018 and breast sono) see dr bert whiltey (br surgeon) and radiologist (butch) and d/cd by dr suárez (med onc)-diagnosed br ca 2010 and TL-will d/c estrace at next visit. Rxd vagifem by Dr. Hough and has not seen her in years-will inform gyn ostroff and breast mds as well and will get note from her breast MDS that vagifem/estrace is benefit>risk; will see dr whitley next week (breast md) and get note; also triple paste to vulva in am; benefit outweighs risk for pt re estrace cream small dose, short interval being rxd for severe vulvar burning-will change to otc once vulvar irritation is improved. lubrication for intercourse advised.-today 7/30/18 feels better and spoke to Dr. Bert Whitley Breast surgeon [facility] who states she had ER+ non-metastatic breast ca and she may take topical (Vagifem and estrace each 2x week) and estradiol level drawn today-I spoke to him directly and he willi send me an email approving this (2/4/19)  pt discharged from  Dr. Suárez med onc (9 y 5/19) ; vulva much improved: 2/4/19 no further burning feels well, takes vagifem 2 x week and approved by Dr. Whitley ([phone]) to take ; e2 <5 on vagifem repeated at next blood draw (at dr chaudhary-message sent to her)-now only vagifem 2x week no estrace \par 8/23/19:  using vagifem successfully-e2 level earlier this year was 0 mg/dl c/w topical e2 and vulvar burning much improved.\par #2 gyn Dr. Neeru Barrera-up to date with exams seeing her again next year-RECORDS REQ NOT REC'D: I will service the patient's GYN for now and for the foreseeable future\par #3 thyroid nodule removed-benign; still has cough will ck with pcp (told gerd..?)will ck last cxr ; also sees pulmonary md at [facility]-dr eleazar covington-I did gyn exam and pap 2019 negative\par #4 dexa last done 2017-osteoporosis--2.5 will ck in 2019 and decide if med-does exercise and takes d3 and calcium and will see dr chaudhary -who is following her\par #5 colo 2018 pending DR Henriquez normal no polyp (Polyp x 1 in past) rpt 2023 or per dr henriquez\par #6 sono pelvis normal may 2019-ro not visualized due to bowel gas-rpt as clinially necessary-el nl 1mm, LO nl\par #7 affirm (due to vag d/c) negative 2018 no d/c or sx at present\par #8 pcp twyla iwrin all up to date\par #9 all ques ans to pt apparent satisfaction\par #10 ptns for oab component of udfr/zo from pop; monthly booster sessions as needed\par #11pap, hpv taken normal\par #12 taking synthroid w/dr chaudhary also see above\par #13 bi annual booked for 2/20\par #14 has 'M' spike in blood and sees dr Knott q6 mos to ck heme/onc re marker for multiple myeloma (3/25)\par #15 pt having eye issues ref to Dr. Derian Correia if she needs another opinion but doing better today after Dr. Meng/opthalmologist rxd ('slt in cornea' has improved vision)\par #16 [facility] ua cytol negative july 2019 and had a pvr of  0 cc; sent today and pvr also 0 cc's\par

## 2019-08-23 NOTE — PROCEDURE
[Unable to tolerate drug therapy] : because she is unable to tolerate drug therapy [Urgency] : urinary urgency [Frequency] : urinary frequency [#3] : treatment #3 [Right Ankle] : right ankle [Patient Tolerated Treatment] : patient tolerated treatment well [Appropriate Reflexes Elicited] : appropriate reflexes were elicited [Treatment cycle completed] : treatment cycle was completed [Adverse Reactions] : no adverse reactions, [FreeTextEntry1] : pt tolerated well rtc one month; sl < UUI; booster #3; rt ankle level 2 only but reflexes exhibited well

## 2019-08-23 NOTE — PHYSICAL EXAM
[No Acute Distress] : in no acute distress [Well developed] : well developed [Well Nourished] : ~L well nourished [Good Hygeine] : demonstrates good hygeine [Oriented x3] : oriented to person, place, and time [Normal Memory] : ~T memory was ~L unimpaired [Normal Mood/Affect] : mood and affect are normal [Normal Lung Sounds] : the lungs were clear to auscultation [Respirations regular] : ~T respiratory rate was regular [Rate & Rhythm Regular] : ~T heart rate and rhythm were normal [No Edema] : ~T edema was not present [Supple] : ~T the neck demonstrated no ~M decrease in suppleness [Thyroid Normal] : the thyroid ~T showed no abnormalities [Symmetrical] : the neck was ~L symmetrical [Warm and Dry] : was warm and dry to touch [Turgor Normal] : skin turgor ~T was normal [Normal Gait] : gait was normal [No Joint Swelling] : there was no joint swelling [No Clubbing, Cyanosis] : no clubbing or cyanosis of the fingernails [Normal Strength/Tone] : muscle strength and tone were normal [Vulvar Atrophy] : vulvar atrophy [Labia Majora] : were normal [Vulvar Scarring] : vulvar scarring [Labia Majora Erythema] : erythema [Labia Minora] : were normal [Labia Minora Erythema] : erythema [Bartholin's Gland] : both Bartholin's glands were normal  [Normal Appearance] : general appearance was normal [Atrophy] : atrophy [4] : 4 [] : I [Uterine Adnexae] : were not tender and not enlarged [Normal rectal exam] : was normal [Normal] : was normal [None] : no [Mass] : no breast mass [Tender] : no tenderness [Nipple Discharge] : no nipple discharge [Mass (___ Cm)] : no ~M [unfilled] abdominal mass was palpated [Tenderness] : ~T no ~M abdominal tenderness observed [H/Smegaly] : no hepatosplenomegaly [Supraclavicular LAD] : no adenopathy noted in supraclavicular lymph nodes [Axillary LAD] : no adenopathy was noted in axillary nodes [Inguinal LAD] : no adenopathy was noted in the inguinal lymph nodes [Rash/Lesion] : no rash or lesion was noted [Estrogen Effect] : no estrogen effect was observed

## 2019-08-25 LAB
APPEARANCE: CLEAR
BACTERIA UR CULT: NORMAL
BACTERIA: NEGATIVE
BILIRUBIN URINE: NEGATIVE
BLOOD URINE: NEGATIVE
COLOR: NORMAL
GLUCOSE QUALITATIVE U: NEGATIVE
HYALINE CASTS: 0 /LPF
KETONES URINE: NEGATIVE
LEUKOCYTE ESTERASE URINE: NEGATIVE
MICROSCOPIC-UA: NORMAL
NITRITE URINE: NEGATIVE
PH URINE: 6
PROTEIN URINE: NEGATIVE
RED BLOOD CELLS URINE: 1 /HPF
SPECIFIC GRAVITY URINE: 1.02
SQUAMOUS EPITHELIAL CELLS: 1 /HPF
UROBILINOGEN URINE: NORMAL
WHITE BLOOD CELLS URINE: 1 /HPF

## 2019-08-26 LAB — URINE CYTOLOGY: NORMAL

## 2019-09-27 ENCOUNTER — APPOINTMENT (OUTPATIENT)
Dept: OBGYN | Facility: CLINIC | Age: 74
End: 2019-09-27
Payer: MEDICARE

## 2019-09-27 VITALS
SYSTOLIC BLOOD PRESSURE: 130 MMHG | HEIGHT: 64 IN | BODY MASS INDEX: 23.22 KG/M2 | WEIGHT: 136 LBS | HEART RATE: 77 BPM | DIASTOLIC BLOOD PRESSURE: 68 MMHG

## 2019-09-27 DIAGNOSIS — N90.89 OTHER SPECIFIED NONINFLAMMATORY DISORDERS OF VULVA AND PERINEUM: ICD-10-CM

## 2019-09-27 PROCEDURE — 99214 OFFICE O/P EST MOD 30 MIN: CPT | Mod: 25

## 2019-09-27 PROCEDURE — 64566 NEUROELTRD STIM POST TIBIAL: CPT

## 2020-02-10 ENCOUNTER — RX RENEWAL (OUTPATIENT)
Age: 75
End: 2020-02-10

## 2020-02-28 ENCOUNTER — APPOINTMENT (OUTPATIENT)
Dept: OBGYN | Facility: CLINIC | Age: 75
End: 2020-02-28
Payer: MEDICARE

## 2020-02-28 VITALS
HEIGHT: 64 IN | SYSTOLIC BLOOD PRESSURE: 137 MMHG | BODY MASS INDEX: 23.27 KG/M2 | HEART RATE: 80 BPM | WEIGHT: 136.3 LBS | DIASTOLIC BLOOD PRESSURE: 81 MMHG

## 2020-02-28 DIAGNOSIS — N60.09 SOLITARY CYST OF UNSPECIFIED BREAST: ICD-10-CM

## 2020-02-28 DIAGNOSIS — N60.02 SOLITARY CYST OF LEFT BREAST: ICD-10-CM

## 2020-02-28 PROCEDURE — 99213 OFFICE O/P EST LOW 20 MIN: CPT

## 2020-02-28 NOTE — COUNSELING
[FreeTextEntry1] : #1 vulvar burning/pain c/w friction marks from intercourse (if resolves with no intercourse til next visit -4-6 weeks-with estrace 1/4 gm 3x week at bedtime-if not will pursue further-to use on finger only not inserted-segundo next vist-also lubrication when resumes intercourse (2-3 weeks); cont vagifem-(mammo neg 2018 and breast sono) see dr bert whitley (br surgeon) and radiologist (butch) and d/cd by dr suárez (med onc)-diagnosed br ca 2010 and TL-will d/c estrace at next visit. Rxd vagifem by Dr. Hough and has not seen her in years-will inform gyn ostroff and breast mds as well and will get note from her breast MDS that vagifem/estrace is benefit>risk; will see dr whitley next week (breast md) and get note; also triple paste to vulva in am; benefit outweighs risk for pt re estrace cream small dose, short interval being rxd for severe vulvar burning-will change to otc once vulvar irritation is improved. lubrication for intercourse advised.-today 7/30/18 feels better and spoke to Dr. Bert Whitley Breast surgeon [facility] who states she had ER+ non-metastatic breast ca and she may take topical (Vagifem and estrace each 2x week) and estradiol level drawn today-I spoke to him directly and he willi send me an email approving this (2/4/19)  pt discharged from  Dr. Suárez med onc (9 y 5/19) ; vulva much improved: 2/4/19 no further burning feels well, takes vagifem 2 x week and approved by Dr. Whitley ([phone]) to take ; e2 <5 on vagifem repeated at next blood draw (at dr chaudhary-message sent to her)-now only vagifem 2x week no estrace -patient has a small breast cyst at 2:00 in the left breast 5 cm from the areola it is approximately 8 x 10 x 10 mm and is slightly tender-she also has an apparent stitch at the 130 o'clock position at the periareolar border from the last surgery 10 years ago and for both of these she is going to have a mammos sono soon as possible diagnostic and see Dr. Whitley.  Officially I did let the patient know that she should stop using her Vagifem until her mammo-sono however I do understand if she is very uncomfortable that she may continue using it. estradiol level cked 2019 on vagifem and level was nearly undetectable-nevertheless, pt accepts risks associated w/vagifem and prior dx br ca (with current 'cystic' area found on exam) all r/b/a given\par 8/23/19:  using vagifem successfully-e2 level earlier this year was 0 mg/dl c/w topical e2 and vulvar burning much improved.  See message above\par #2 gyn Dr. Neeru Barrera-up to date with exams seeing her again next year-RECORDS REQ NOT REC'D: I will service the patient's GYN for now and for the foreseeable future\par #3 thyroid nodule removed-benign; still has cough will ck with pcp (told gerd..?)will ck last cxr ; also sees pulmonary md at [facility]-dr eleazar covington-I did gyn exam and pap 2019 negative and normal GYN exam today except for the findings above in the breast\par #4 dexa last done 2017-osteoporosis--2.5 will ck in 2019 and decide if med-does exercise and takes d3 and calcium and will see dr chaudhary -who is following her-repeat bone density will be done later this year\par #5 colo 2018 pending DR Henriquez normal no polyp (Polyp x 1 in past) rpt 2023 or per dr henriquez\par #6 sono pelvis normal may 2019-ro not visualized due to bowel gas-rpt as clinially necessary-el nl 1mm, LO nl\par #7 affirm (due to vag d/c) negative 2018 no d/c or sx at present is a vaginal discharge today\par #8 pcp twyla irwin all up to date\par #9 all ques ans to pt apparent satisfaction\par #10 ptns for oab component of udfr/oz from pop; monthly booster sessions as needed #4 booster today level #18 tolerated well.  Most days the patient is somewhat better and declines PTNS for today booster although she knows that she is covered for several other boosters if she would like this over the next several years\par #11pap, hpv taken normal 2019 this will be repeated at the two-year stuart\par #12 taking synthroid w/dr chaudhary also see above is currently on 50 mcg/day\par #13 bi annual booked for 10/20\par #14 has 'M' spike in blood and sees dr Knott q6 mos to ck heme/onc re marker for multiple myeloma (3/25) pending 9/19 repeat labs went down at the last visit and she will be followed by Dr. Knott on March 23\par #15 pt having eye issues ref to Dr. Derian Correia if she needs another opinion but doing better today after Dr. 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

## 2020-02-28 NOTE — REVIEW OF SYSTEMS
[All Other ROS] : all other reviewed systems are negative [Anxiety] : anxiety [Eyesight Problems] : eyesight problems

## 2020-02-28 NOTE — PHYSICAL EXAM
[No Acute Distress] : in no acute distress [Well developed] : well developed [Well Nourished] : ~L well nourished [Oriented x3] : oriented to person, place, and time [Good Hygeine] : demonstrates good hygeine [Normal Memory] : ~T memory was ~L unimpaired [Normal Lung Sounds] : the lungs were clear to auscultation [Normal Mood/Affect] : mood and affect are normal [Respirations regular] : ~T respiratory rate was regular [No Edema] : ~T edema was not present [Rate & Rhythm Regular] : ~T heart rate and rhythm were normal [Supple] : ~T the neck demonstrated no ~M decrease in suppleness [Symmetrical] : the neck was ~L symmetrical [Thyroid Normal] : the thyroid ~T showed no abnormalities [Mass] : breast mass [Warm and Dry] : was warm and dry to touch [Turgor Normal] : skin turgor ~T was normal [No Joint Swelling] : there was no joint swelling [Normal Gait] : gait was normal [No Clubbing, Cyanosis] : no clubbing or cyanosis of the fingernails [Normal Strength/Tone] : muscle strength and tone were normal [Vulvar Scarring] : vulvar scarring [Vulvar Atrophy] : vulvar atrophy [Labia Majora Erythema] : erythema [Labia Majora] : were normal [Labia Minora] : were normal [Bartholin's Gland] : both Bartholin's glands were normal  [Labia Minora Erythema] : erythema [Normal Appearance] : general appearance was normal [Atrophy] : atrophy [] : I [4] : 4 [Uterine Adnexae] : were not tender and not enlarged [Normal rectal exam] : was normal [Normal] : was normal [None] : no [Tender] : no tenderness [Nipple Discharge] : no nipple discharge [Mass (___ Cm)] : no ~M [unfilled] abdominal mass was palpated [Tenderness] : ~T no ~M abdominal tenderness observed [H/Smegaly] : no hepatosplenomegaly [Supraclavicular LAD] : no adenopathy noted in supraclavicular lymph nodes [Axillary LAD] : no adenopathy was noted in axillary nodes [Inguinal LAD] : no adenopathy was noted in the inguinal lymph nodes [Rash/Lesion] : no rash or lesion was noted [Estrogen Effect] : no estrogen effect was observed

## 2020-02-28 NOTE — HISTORY OF PRESENT ILLNESS
[Cystocele (Obstetric)] : none [Uterine Prolapse] : none [Vaginal Wall Prolapse] : none [Rectal Prolapse] : none [Urge Incontinence Of Urine] : none [Stress Incontinence] : none [Unable To Restrain Bowel Movement] : none [x1] : once nightly [Urinary Stream Starts And Stops] : none [Incomplete Emptying Of Bladder] : none [Feelings Of Urinary Urgency] : none [Urinary Frequency] : none [Urinary Tract Infection] : rare [Hematuria] : none [Pain During Urination (Dysuria)] : none [Constipation Obstructed Defecation] : none [Incomplete Emptying Of Stool] : none [Diarrhea] : none [Stool Visible Blood] : none [Pelvic Pain] : frequent [Vaginal Pain] : rare [Vulvar Pain] : daily [Bladder Pain] : none [Rectal Pain] : rare [Back Pain] : rare [Sexual Dysfunction, NOS] : frequent [] : none

## 2020-02-28 NOTE — PROCEDURE
[Unable to tolerate drug therapy] : because she is unable to tolerate drug therapy [Urgency] : urinary urgency [Frequency] : urinary frequency [#3] : treatment #3 [Patient Tolerated Treatment] : patient tolerated treatment well [Right Ankle] : right ankle [Appropriate Reflexes Elicited] : appropriate reflexes were elicited [Treatment cycle completed] : treatment cycle was completed [Adverse Reactions] : no adverse reactions, [FreeTextEntry1] : pt tolerated well rtc one month; sl < UUI; booster #4; rt ankle level #18 but reflexes exhibited well

## 2020-03-01 LAB
APPEARANCE: CLEAR
BACTERIA UR CULT: NORMAL
BACTERIA: NEGATIVE
BILIRUBIN URINE: NEGATIVE
BLOOD URINE: NEGATIVE
COLOR: YELLOW
GLUCOSE QUALITATIVE U: NEGATIVE
HYALINE CASTS: 0 /LPF
KETONES URINE: NEGATIVE
LEUKOCYTE ESTERASE URINE: NEGATIVE
MICROSCOPIC-UA: NORMAL
NITRITE URINE: NEGATIVE
PH URINE: 6
PROTEIN URINE: NORMAL
RED BLOOD CELLS URINE: 2 /HPF
SPECIFIC GRAVITY URINE: 1.03
SQUAMOUS EPITHELIAL CELLS: 8 /HPF
UROBILINOGEN URINE: NORMAL
WHITE BLOOD CELLS URINE: 5 /HPF

## 2020-03-02 DIAGNOSIS — N63.20 UNSPECIFIED LUMP IN THE LEFT BREAST, UNSPECIFIED QUADRANT: ICD-10-CM

## 2020-03-02 LAB — URINE CYTOLOGY: NORMAL

## 2020-08-25 ENCOUNTER — RX RENEWAL (OUTPATIENT)
Age: 75
End: 2020-08-25

## 2020-08-26 ENCOUNTER — TRANSCRIPTION ENCOUNTER (OUTPATIENT)
Age: 75
End: 2020-08-26

## 2020-10-02 ENCOUNTER — APPOINTMENT (OUTPATIENT)
Dept: OBGYN | Facility: CLINIC | Age: 75
End: 2020-10-02
Payer: MEDICARE

## 2020-10-02 VITALS
HEART RATE: 76 BPM | DIASTOLIC BLOOD PRESSURE: 82 MMHG | SYSTOLIC BLOOD PRESSURE: 141 MMHG | HEIGHT: 64 IN | WEIGHT: 131 LBS | BODY MASS INDEX: 22.36 KG/M2

## 2020-10-02 PROCEDURE — 99214 OFFICE O/P EST MOD 30 MIN: CPT

## 2020-10-02 NOTE — HISTORY OF PRESENT ILLNESS
[Cystocele (Obstetric)] : no [Uterine Prolapse] : no [Vaginal Wall Prolapse] : no [Rectal Prolapse] : no [Stress Incontinence] : no [Urge Incontinence Of Urine] : no [Unable To Restrain Bowel Movement] : mild [x1] : nocturia once nightly [Urinary Stream Starts And Stops] : no [Incomplete Emptying Of Bladder] : no [Urinary Frequency] : no [Feelings Of Urinary Urgency] : no [Pain During Urination (Dysuria)] : no [Urinary Tract Infection] : no [Hematuria] : no [Constipation Obstructed Defecation] : no [Incomplete Emptying Of Stool] : no [Stool Visible Blood] : no [Diarrhea] : no [Pelvic Pain] : no [Vaginal Pain] : no [Vulvar Pain] : no [Bladder Pain] : no [Rectal Pain] : no [Back Pain] : no [Sexual Dysfunction, NOS] : no [] : no [FreeTextEntry1] : better oab/DH: spinal stenosis worse (may acct for sx)

## 2020-10-02 NOTE — PHYSICAL EXAM
[Chaperone Present] : A chaperone was present in the examining room during all aspects of the physical examination [No Acute Distress] : in no acute distress [Well developed] : well developed [Well Nourished] : ~L well nourished [Good Hygeine] : demonstrates good hygeine [Oriented x3] : oriented to person, place, and time [Normal Memory] : ~T memory was ~L unimpaired [Normal Mood/Affect] : mood and affect are normal [Normal Lung Sounds] : the lungs were clear to auscultation [Respirations regular] : ~T respiratory rate was regular [Rate & Rhythm Regular] : ~T heart rate and rhythm were normal [No Edema] : ~T edema was not present [Supple] : ~T the neck demonstrated no ~M decrease in suppleness [Thyroid Normal] : the thyroid ~T showed no abnormalities [Symmetrical] : the neck was ~L symmetrical [Warm and Dry] : was warm and dry to touch [Turgor Normal] : skin turgor ~T was normal [Normal Gait] : gait was normal [No Joint Swelling] : there was no joint swelling [No Clubbing, Cyanosis] : no clubbing or cyanosis of the fingernails [Normal Strength/Tone] : muscle strength and tone were normal [Vulvar Atrophy] : vulvar atrophy [Vulvar Scarring] : vulvar scarring [Labia Majora] : were normal [Labia Majora Erythema] : erythema [Labia Minora] : were normal [Labia Minora Erythema] : erythema [Bartholin's Gland] : both Bartholin's glands were normal  [Normal Appearance] : general appearance was normal [Atrophy] : atrophy [4] : 4 [] : I [Uterine Adnexae] : were not tender and not enlarged [Normal rectal exam] : was normal [Normal] : was normal [None] : no [Mass] : no breast mass [Tender] : no tenderness [Nipple Discharge] : no nipple discharge [Mass (___ Cm)] : no ~M [unfilled] abdominal mass was palpated [Tenderness] : ~T no ~M abdominal tenderness observed [H/Smegaly] : no hepatosplenomegaly [Supraclavicular LAD] : no adenopathy noted in supraclavicular lymph nodes [Axillary LAD] : no adenopathy was noted in axillary nodes [Inguinal LAD] : no adenopathy was noted in the inguinal lymph nodes [Rash/Lesion] : no rash or lesion was noted [Estrogen Effect] : no estrogen effect was observed

## 2020-10-02 NOTE — COUNSELING
[FreeTextEntry1] : #1 vulvar burning/pain c/w friction marks from intercourse (if resolves with no intercourse til next visit -4-6 weeks-with estrace 1/4 gm 3x week at bedtime-if not will pursue further-to use on finger only not inserted-segundo next vist-also lubrication when resumes intercourse (2-3 weeks); cont vagifem-(mammo neg 2018 and breast sono) see dr bert whitley (br surgeon) and radiologist (butch) and d/cd by dr hodge (med onc)-diagnosed br ca 2010 and TL-will d/c estrace at next visit. Rxd vagifem by Dr. Hough and has not seen her in years-will inform gyn ostroff and breast mds as well and will get note from her breast MDS that vagifem/estrace is benefit>risk; will see dr whitley next week (breast md) and get note; also triple paste to vulva in am; benefit outweighs risk for pt re estrace cream small dose, short interval being rxd for severe vulvar burning-will change to otc once vulvar irritation is improved. lubrication for intercourse advised.-today 7/30/18 feels better and spoke to Dr. Bert Whitley Breast surgeon [facility] who states she had ER+ non-metastatic breast ca and she may take topical (Vagifem) twice weekly mammo sono and breast exam by me and breast surgeon dr bert whitley normal -rpt mammo/sono 2021 rx given will see her every year (Angi)\par #2 gyn Dr. Neeru Barrera-up to date with exams seeing her again next year-RECORDS REQ NOT REC'D: I will service the patient's GYN for now and for the foreseeable future-exam normal today breast and pelvic\par #3 thyroid nodule removed-benign; still has cough will ck with pcp (told gerd..?)will ck last cxr ; also sees pulmonary md at [facility]-dr eleazar covington-I did gyn exam 2019 and today 2020 and pap 2019 negative and normal GYN exam today\par #4 dexa last done 2017-osteoporosis--2.5 will ck in 2019 and decide if med-does exercise and takes d3 and calcium and will see dr chaudhary -who is following her-repeat bone density stable-penic rpt 2022\par #5 colo 2018 pending DR Henriquez normal no polyp (Polyp x 1 in past) rpt 2023 or per dr henriquez\par #6 sono pelvis normal 2020 rpt 2021\par #7 affirm (due to vag d/c) negative 2018 no d/c 10/2/2020\par #8 pcp twyla irwin all up to date with vaccines flu, pneumovax and shingles vaccine\par #9 all ques ans to pt apparent satisfaction\par #10 ptns for oab component of udfr/oz from pop; completed. \par #11 pap, hpv taken normal 2019 this will be repeated at the two-year stuart 2021\par #12 taking synthroid w/dr chaudhary also see above is currently on 50 mcg/day\par #13 bi annual booked for may 2021\par #14 has 'M' spike in blood and sees dr Knott q6 mos to ck heme/onc re marker for multiple myeloma (3/25) pending 9/19 repeat labs went down at the last visit and she will be followed by Dr. Knott on March 23-stable sl lower at lv and has 10/26 appt visit\par #15 pt having eye issues ref to Dr. Derian Correia if she needs another opinion but doing better today after Dr. Meng/opthalmologist rxd ('slt in cornea' has improved vision)\par #16 uc ua cytol negative july 2019 and had a pvr of  0 cc; sent today and pvr also 0 cc's\par #17 SHINGLES SECOND DOSE WILL HAVE THIS DONE IN [place]-done\par jmr

## 2020-10-02 NOTE — PROCEDURE
[Unable to tolerate drug therapy] : because she is unable to tolerate drug therapy [Urgency] : urinary urgency [Frequency] : urinary frequency [#3] : treatment #3 [Right Ankle] : right ankle [Patient Tolerated Treatment] : patient tolerated treatment well [Appropriate Reflexes Elicited] : appropriate reflexes were elicited [Treatment cycle completed] : treatment cycle was completed [Adverse Reactions] : no adverse reactions, [FreeTextEntry1] : pt tolerated well rtc one month; sl < UUI; booster #4; rt ankle level #18 but reflexes exhibited well

## 2020-10-04 LAB
APPEARANCE: CLEAR
BACTERIA UR CULT: NORMAL
BACTERIA: NEGATIVE
BILIRUBIN URINE: NEGATIVE
BLOOD URINE: NEGATIVE
COLOR: NORMAL
GLUCOSE QUALITATIVE U: NEGATIVE
HYALINE CASTS: 1 /LPF
KETONES URINE: NEGATIVE
LEUKOCYTE ESTERASE URINE: NEGATIVE
MICROSCOPIC-UA: NORMAL
NITRITE URINE: NEGATIVE
PH URINE: 8
PROTEIN URINE: NORMAL
RED BLOOD CELLS URINE: 0 /HPF
SPECIFIC GRAVITY URINE: 1.02
SQUAMOUS EPITHELIAL CELLS: 1 /HPF
UROBILINOGEN URINE: NORMAL
WHITE BLOOD CELLS URINE: 0 /HPF

## 2020-10-05 DIAGNOSIS — R10.2 PELVIC AND PERINEAL PAIN: ICD-10-CM

## 2020-10-05 LAB — URINE CYTOLOGY: NORMAL

## 2020-12-16 PROBLEM — Z12.11 ENCOUNTER FOR SCREENING COLONOSCOPY: Status: RESOLVED | Noted: 2018-07-03 | Resolved: 2020-12-16

## 2021-03-09 ENCOUNTER — APPOINTMENT (OUTPATIENT)
Dept: ORTHOPEDIC SURGERY | Facility: CLINIC | Age: 76
End: 2021-03-09

## 2021-03-10 ENCOUNTER — APPOINTMENT (OUTPATIENT)
Dept: ORTHOPEDIC SURGERY | Facility: CLINIC | Age: 76
End: 2021-03-10
Payer: MEDICARE

## 2021-03-10 VITALS — DIASTOLIC BLOOD PRESSURE: 80 MMHG | HEART RATE: 92 BPM | SYSTOLIC BLOOD PRESSURE: 171 MMHG

## 2021-03-10 DIAGNOSIS — M89.9 DISORDER OF BONE, UNSPECIFIED: ICD-10-CM

## 2021-03-10 PROCEDURE — 99203 OFFICE O/P NEW LOW 30 MIN: CPT

## 2021-03-10 NOTE — PHYSICAL EXAM
[FreeTextEntry1] : Physical exam revealed a healthy looking patient in no apparent distress patient appears to be fully alert oriented having no significant complaints no pain having full range of motion over the right hip no palpable mass no gross neurovascular deficit previously patient had lumbar spine fusion.  Review of the MRI scan of the pelvis including right hip demonstrate small benign-appearing nonaggressive lesion at the level of the femoral head suggest to be a chondroid process at this stage patient was recommended to be followed conservatively and to be seen again in an 6 months with a new MRI scan of the pelvis

## 2021-03-10 NOTE — HISTORY OF PRESENT ILLNESS
[FreeTextEntry1] : This is the first visit of a 75 years old female recently had x-ray and MRI scan of the pelvis for unrelated condition which demonstrated an incidental finding of a nonaggressive benign-appearing small chondroid lesion involving the right femoral head.  Patient having no pain whatsoever.  Previous medical history significant for breast carcinoma

## 2021-06-28 ENCOUNTER — APPOINTMENT (OUTPATIENT)
Dept: OBGYN | Facility: CLINIC | Age: 76
End: 2021-06-28
Payer: MEDICARE

## 2021-06-28 VITALS — HEART RATE: 76 BPM | DIASTOLIC BLOOD PRESSURE: 61 MMHG | SYSTOLIC BLOOD PRESSURE: 125 MMHG | HEIGHT: 64 IN

## 2021-06-28 PROCEDURE — 51798 US URINE CAPACITY MEASURE: CPT

## 2021-06-28 PROCEDURE — 99214 OFFICE O/P EST MOD 30 MIN: CPT | Mod: 25

## 2021-06-28 NOTE — HISTORY OF PRESENT ILLNESS
[Cystocele (Obstetric)] : no [Uterine Prolapse] : no [Vaginal Wall Prolapse] : no [Rectal Prolapse] : no [Stress Incontinence] : no [Urge Incontinence Of Urine] : no [Unable To Restrain Bowel Movement] : mild [x1] : nocturia once nightly [Urinary Stream Starts And Stops] : no [Incomplete Emptying Of Bladder] : no [Feelings Of Urinary Urgency] : no [Urinary Frequency] : no [Pain During Urination (Dysuria)] : no [Urinary Tract Infection] : no [Hematuria] : no [Constipation Obstructed Defecation] : no [Incomplete Emptying Of Stool] : no [Stool Visible Blood] : no [Diarrhea] : no [Pelvic Pain] : no [Vaginal Pain] : no [Vulvar Pain] : no [Bladder Pain] : no [Rectal Pain] : no [Back Pain] : no [Sexual Dysfunction, NOS] : no [] : no [FreeTextEntry1] : better oab/DH: spinal stenosis worse (may acct for sx)

## 2021-06-28 NOTE — PROCEDURE
[Unable to tolerate drug therapy] : because she is unable to tolerate drug therapy [Urgency] : urinary urgency [Frequency] : urinary frequency [#3] : treatment #3 [Right Ankle] : right ankle [Adverse Reactions] : no adverse reactions, [Patient Tolerated Treatment] : patient tolerated treatment well [Appropriate Reflexes Elicited] : appropriate reflexes were elicited [Treatment cycle completed] : treatment cycle was completed [FreeTextEntry1] : pt tolerated well rtc one month; sl < UUI; booster #4; rt ankle level #18 but reflexes exhibited well

## 2021-06-29 LAB
APPEARANCE: CLEAR
BACTERIA: NEGATIVE
BILIRUBIN URINE: NEGATIVE
BLOOD URINE: NEGATIVE
COLOR: YELLOW
GLUCOSE QUALITATIVE U: NEGATIVE
HPV HIGH+LOW RISK DNA PNL CVX: NOT DETECTED
HYALINE CASTS: 3 /LPF
KETONES URINE: NEGATIVE
LEUKOCYTE ESTERASE URINE: NEGATIVE
MICROSCOPIC-UA: NORMAL
NITRITE URINE: NEGATIVE
PH URINE: 6
PROTEIN URINE: NORMAL
RED BLOOD CELLS URINE: 2 /HPF
SPECIFIC GRAVITY URINE: 1.03
SQUAMOUS EPITHELIAL CELLS: 7 /HPF
UROBILINOGEN URINE: NORMAL
WHITE BLOOD CELLS URINE: 5 /HPF

## 2021-06-30 LAB — BACTERIA UR CULT: NORMAL

## 2021-07-02 LAB — CYTOLOGY CVX/VAG DOC THIN PREP: NORMAL

## 2021-07-06 ENCOUNTER — NON-APPOINTMENT (OUTPATIENT)
Age: 76
End: 2021-07-06

## 2021-08-22 ENCOUNTER — TRANSCRIPTION ENCOUNTER (OUTPATIENT)
Age: 76
End: 2021-08-22

## 2021-11-08 RX ORDER — ESTRADIOL 10 UG/1
10 TABLET, FILM COATED VAGINAL
Qty: 24 | Refills: 3 | Status: ACTIVE | COMMUNITY
Start: 2021-11-08 | End: 1900-01-01

## 2021-11-09 RX ORDER — ESTRADIOL 10 UG/1
10 TABLET, FILM COATED VAGINAL
Qty: 25 | Refills: 3 | Status: ACTIVE | COMMUNITY
Start: 2021-11-09 | End: 1900-01-01

## 2021-12-13 ENCOUNTER — APPOINTMENT (OUTPATIENT)
Dept: OBGYN | Facility: CLINIC | Age: 76
End: 2021-12-13
Payer: MEDICARE

## 2021-12-13 VITALS
DIASTOLIC BLOOD PRESSURE: 87 MMHG | WEIGHT: 134 LBS | BODY MASS INDEX: 22.88 KG/M2 | SYSTOLIC BLOOD PRESSURE: 146 MMHG | HEIGHT: 64 IN | HEART RATE: 90 BPM

## 2021-12-13 DIAGNOSIS — R35.0 FREQUENCY OF MICTURITION: ICD-10-CM

## 2021-12-13 PROCEDURE — 99214 OFFICE O/P EST MOD 30 MIN: CPT

## 2021-12-13 RX ORDER — ESTRADIOL 10 UG/1
10 TABLET, FILM COATED VAGINAL
Qty: 1 | Refills: 11 | Status: ACTIVE | COMMUNITY
Start: 2021-12-13 | End: 1900-01-01

## 2021-12-13 NOTE — COUNSELING
[FreeTextEntry1] : #1 vulvar burning/pain c/w friction marks from intercourse (if resolves with no intercourse til next visit -4-6 weeks-with estrace 1/4 gm 3x week at bedtime-if not will pursue further-to use on finger only not inserted-segundo next vist-also lubrication when resumes intercourse (2-3 weeks); cont vagifem-(mammo neg 2018 and breast sono) see dr bert whitley (br surgeon) and radiologist (butch) and d/cd by dr hodge (med onc)-diagnosed br ca 2010 and TL-will d/c estrace at next visit. Rxd vagifem by Dr. Hough and has not seen her in years-will inform gyn ostroff and breast mds as well and will get note from her breast MDS that vagifem/estrace is benefit>risk; will see dr whitley next week (breast md) and get note; also triple paste to vulva in am; benefit outweighs risk for pt re estrace cream small dose, short interval being rxd for severe vulvar burning-will change to otc once vulvar irritation is improved. lubrication for intercourse advised.-today 7/30/18 feels better and spoke to Dr. Bert Whitley Breast surgeon [facility] who states she had ER+ non-metastatic breast ca and she may take topical (Vagifem) twice weekly mammo sono and breast exam by me and breast surgeon dr bert whitley normal -rpt mammo/sono 2021 rx given will see her every year (Angi seeing him july 12 2021); exam nl. Permission for vagifem given by Dr. Whitley her breast surgeon. and last saw him july 2021!\par #2 gyn Dr. Neeru Barrera-up to date with exams seeing her again next year-RECORDS REQ NOT REC'D: I will servie as the patient's GYN for now and for the foreseeable future-exam normal today breast and pelvic 12/13/21; pvr nl, sent  ua, ucytol and uculture. \par #3 thyroid nodule [id number] see dr john yearly saw her 2/21; still has cough will ck with pcp (told gerd..?)will ck last cxr ; also sees pulmonary md at [facility]-dr eleazar covington-I did gyn exam 6/28/21 and pap and hpv done- and normal GYN exam today 6/28/21\par #4 dexa last done 2017-osteoporosis--2.5 will ck in 2019 and decide if med-does exercise and takes d3 and calcium and will see dr chaudhary -who is following her-repeat bone density stable-penic rpt 2022\par #5 colo 2018 pending DR Henriquez normal no polyp (Polyp x 1 in past) rpt 2023 or per dr henriquez\par #6 sono pelvis normal 2020 rpt 2021 reordered 12/13/21 (had this summer 2021 we will locate)\par #7 affirm (due to vag d/c) negative 2018 no d/c 12/13/21\par #8 pcp twyla irwin all up to date with vaccines flu, pneumovax and shingles vaccine seeing her regularly\par #9 all ques ans to pt apparent satisfaction\par #10 ptns for oab component of udfr/oz from pop; completed. doing better since her back surgery (this is in all likelihood due to the relief compression in the lumbar (l3-4) spine and the ability of the loop 2 afferent/effernt nerves to be under the control of the CNS once again and also to allow the sympathetic T10-L2 area to function better autonomically to allow filling to take place without increasing overactivity parasympathetically)\par #11 pap, hpv taken 6/28/21 normal rpt as clinically indicated\par #12 taking synthroid w/dr chaudhary also see above is currently on 50 mcg/day; will see her 2/22\par #13 bi annual booked for sprilng/summer 2022\par #14 has 'M' spike in blood and sees dr Knott q6 mos to ck heme/onc re marker for multiple myeloma (3/25) pending 9/19 repeat labs went down at the last visit and she will be followed by Dr. Nikos matos seeing him again recently and yearly now.\par #15 pt having eye issues ref to Dr. Derian Correia if she needs another opinion but doing better today after Dr. 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

## 2021-12-13 NOTE — PHYSICAL EXAM
[No Acute Distress] : in no acute distress [Well developed] : well developed [Well Nourished] : ~L well nourished [Good Hygeine] : demonstrates good hygeine [Oriented x3] : oriented to person, place, and time [Normal Memory] : ~T memory was ~L unimpaired [Normal Mood/Affect] : mood and affect are normal [Normal Lung Sounds] : the lungs were clear to auscultation [Respirations regular] : ~T respiratory rate was regular [Rate & Rhythm Regular] : ~T heart rate and rhythm were normal [No Edema] : ~T edema was not present [Supple] : ~T the neck demonstrated no ~M decrease in suppleness [Thyroid Normal] : the thyroid ~T showed no abnormalities [Symmetrical] : the neck was ~L symmetrical [Warm and Dry] : was warm and dry to touch [Turgor Normal] : skin turgor ~T was normal [Normal Gait] : gait was normal [No Joint Swelling] : there was no joint swelling [No Clubbing, Cyanosis] : no clubbing or cyanosis of the fingernails [Normal Strength/Tone] : muscle strength and tone were normal [Vulvar Atrophy] : vulvar atrophy [Vulvar Scarring] : vulvar scarring [Labia Majora] : were normal [Labia Majora Erythema] : erythema [Labia Minora] : were normal [Labia Minora Erythema] : erythema [Bartholin's Gland] : both Bartholin's glands were normal  [Normal Appearance] : general appearance was normal [Atrophy] : atrophy [4] : 4 [] : I [Uterine Adnexae] : were not tender and not enlarged [Normal rectal exam] : was normal [Normal] : was normal [None] : no [Chaperone Present] : A chaperone was present in the examining room during all aspects of the physical examination [Aa ____] : Aa [unfilled] [Ba ____] : Ba [unfilled] [C ____] : C [unfilled] [GH ____] : GH [unfilled] [PB ____] : PB [unfilled] [TVL ____] : TVL  [unfilled] [Ap ____] : Ap [unfilled] [Bp ____] : Bp [unfilled] [D ____] : D [unfilled] [FreeTextEntry1] : exam/ck up; on vagifem (cleared by breast surgeon) [Mass] : no breast mass [Tender] : no tenderness [Nipple Discharge] : no nipple discharge [Mass (___ Cm)] : no ~M [unfilled] abdominal mass was palpated [Tenderness] : ~T no ~M abdominal tenderness observed [H/Smegaly] : no hepatosplenomegaly [Supraclavicular LAD] : no adenopathy noted in supraclavicular lymph nodes [Axillary LAD] : no adenopathy was noted in axillary nodes [Inguinal LAD] : no adenopathy was noted in the inguinal lymph nodes [Rash/Lesion] : no rash or lesion was noted [Estrogen Effect] : no estrogen effect was observed

## 2021-12-14 LAB
APPEARANCE: CLEAR
BACTERIA: NEGATIVE
BILIRUBIN URINE: NEGATIVE
BLOOD URINE: NEGATIVE
COLOR: COLORLESS
GLUCOSE QUALITATIVE U: NEGATIVE
HYALINE CASTS: 0 /LPF
KETONES URINE: NEGATIVE
LEUKOCYTE ESTERASE URINE: NEGATIVE
MICROSCOPIC-UA: NORMAL
NITRITE URINE: NEGATIVE
PH URINE: 8
PROTEIN URINE: NORMAL
RED BLOOD CELLS URINE: 2 /HPF
SPECIFIC GRAVITY URINE: 1.02
SQUAMOUS EPITHELIAL CELLS: 1 /HPF
UROBILINOGEN URINE: NORMAL
WHITE BLOOD CELLS URINE: 0 /HPF

## 2021-12-15 LAB — BACTERIA UR CULT: NORMAL

## 2021-12-16 LAB — URINE CYTOLOGY: NORMAL

## 2021-12-17 ENCOUNTER — NON-APPOINTMENT (OUTPATIENT)
Age: 76
End: 2021-12-17

## 2021-12-20 ENCOUNTER — NON-APPOINTMENT (OUTPATIENT)
Age: 76
End: 2021-12-20

## 2022-01-11 ENCOUNTER — NON-APPOINTMENT (OUTPATIENT)
Age: 77
End: 2022-01-11

## 2022-01-13 ENCOUNTER — NON-APPOINTMENT (OUTPATIENT)
Age: 77
End: 2022-01-13

## 2022-02-02 ENCOUNTER — NON-APPOINTMENT (OUTPATIENT)
Age: 77
End: 2022-02-02

## 2022-07-15 ENCOUNTER — NON-APPOINTMENT (OUTPATIENT)
Age: 77
End: 2022-07-15

## 2022-07-18 ENCOUNTER — NON-APPOINTMENT (OUTPATIENT)
Age: 77
End: 2022-07-18

## 2022-07-19 ENCOUNTER — NON-APPOINTMENT (OUTPATIENT)
Age: 77
End: 2022-07-19

## 2022-07-23 PROBLEM — C50.919 BREAST CANCER: Status: ACTIVE | Noted: 2019-02-04

## 2022-07-25 ENCOUNTER — APPOINTMENT (OUTPATIENT)
Dept: OBGYN | Facility: CLINIC | Age: 77
End: 2022-07-25

## 2022-07-25 VITALS
SYSTOLIC BLOOD PRESSURE: 170 MMHG | HEIGHT: 64 IN | DIASTOLIC BLOOD PRESSURE: 80 MMHG | HEART RATE: 85 BPM | WEIGHT: 135 LBS | BODY MASS INDEX: 23.05 KG/M2

## 2022-07-25 DIAGNOSIS — C50.919 MALIGNANT NEOPLASM OF UNSPECIFIED SITE OF UNSPECIFIED FEMALE BREAST: ICD-10-CM

## 2022-07-25 PROCEDURE — 99214 OFFICE O/P EST MOD 30 MIN: CPT

## 2022-07-25 RX ORDER — FLUOROMETHOLONE ACETATE 1 MG/ML
0.1 SUSPENSION/ DROPS OPHTHALMIC
Qty: 5 | Refills: 0 | Status: ACTIVE | COMMUNITY
Start: 2022-06-15

## 2022-07-25 RX ORDER — TAVABOROLE 43.5 MG/ML
5 SOLUTION TOPICAL
Qty: 10 | Refills: 0 | Status: ACTIVE | COMMUNITY
Start: 2021-11-19

## 2022-07-25 RX ORDER — AMOXICILLIN AND CLAVULANATE POTASSIUM 875; 125 MG/1; MG/1
875-125 TABLET, COATED ORAL
Qty: 28 | Refills: 0 | Status: ACTIVE | COMMUNITY
Start: 2022-04-08

## 2022-07-25 RX ORDER — CIMETIDINE 400 MG
400 TABLET ORAL
Qty: 180 | Refills: 0 | Status: ACTIVE | COMMUNITY
Start: 2022-06-20

## 2022-07-25 NOTE — HISTORY OF PRESENT ILLNESS
[Cystocele (Obstetric)] : no [Uterine Prolapse] : no [Vaginal Wall Prolapse] : no [Rectal Prolapse] : no [Stress Incontinence] : no [Urge Incontinence Of Urine] : no [Unable To Restrain Bowel Movement] : mild [x1] : nocturia once nightly [Urinary Stream Starts And Stops] : no [Urinary Frequency] : no [Incomplete Emptying Of Bladder] : no [Feelings Of Urinary Urgency] : no [Pain During Urination (Dysuria)] : no [Urinary Tract Infection] : no [Hematuria] : no [Constipation Obstructed Defecation] : no [Incomplete Emptying Of Stool] : no [Diarrhea] : no [Stool Visible Blood] : no [Pelvic Pain] : no [Vaginal Pain] : no [Vulvar Pain] : no [Bladder Pain] : no [Rectal Pain] : no [Back Pain] : no [Sexual Dysfunction, NOS] : no [] : no [FreeTextEntry1] : better oab/DH: spinal stenosis worse (may acct for sx)

## 2022-07-25 NOTE — PHYSICAL EXAM
[Chaperone Present] : A chaperone was present in the examining room during all aspects of the physical examination [No Acute Distress] : in no acute distress [Well developed] : well developed [Well Nourished] : ~L well nourished [Good Hygeine] : demonstrates good hygeine [Oriented x3] : oriented to person, place, and time [Normal Memory] : ~T memory was ~L unimpaired [Normal Mood/Affect] : mood and affect are normal [Normal Lung Sounds] : the lungs were clear to auscultation [Respirations regular] : ~T respiratory rate was regular [Rate & Rhythm Regular] : ~T heart rate and rhythm were normal [No Edema] : ~T edema was not present [Supple] : ~T the neck demonstrated no ~M decrease in suppleness [Thyroid Normal] : the thyroid ~T showed no abnormalities [Symmetrical] : the neck was ~L symmetrical [Warm and Dry] : was warm and dry to touch [Turgor Normal] : skin turgor ~T was normal [No Joint Swelling] : there was no joint swelling [Normal Gait] : gait was normal [No Clubbing, Cyanosis] : no clubbing or cyanosis of the fingernails [Normal Strength/Tone] : muscle strength and tone were normal [Vulvar Atrophy] : vulvar atrophy [Vulvar Scarring] : vulvar scarring [Labia Majora] : were normal [Labia Majora Erythema] : erythema [Labia Minora] : were normal [Labia Minora Erythema] : erythema [Bartholin's Gland] : both Bartholin's glands were normal  [Normal Appearance] : general appearance was normal [Atrophy] : atrophy [4] : 4 [Aa ____] : Aa [unfilled] [Ba ____] : Ba [unfilled] [C ____] : C [unfilled] [GH ____] : GH [unfilled] [PB ____] : PB [unfilled] [TVL ____] : TVL  [unfilled] [Ap ____] : Ap [unfilled] [Bp ____] : Bp [unfilled] [D ____] : D [unfilled] [] : I [Uterine Adnexae] : were not tender and not enlarged [Normal rectal exam] : was normal [Normal] : was normal [None] : no [FreeTextEntry1] : exam/ck up; on vagifem (cleared by breast surgeon) [Mass] : no breast mass [Tender] : no tenderness [Nipple Discharge] : no nipple discharge [Mass (___ Cm)] : no ~M [unfilled] abdominal mass was palpated [Tenderness] : ~T no ~M abdominal tenderness observed [H/Smegaly] : no hepatosplenomegaly [Supraclavicular LAD] : no adenopathy noted in supraclavicular lymph nodes [Axillary LAD] : no adenopathy was noted in axillary nodes [Inguinal LAD] : no adenopathy was noted in the inguinal lymph nodes [Rash/Lesion] : no rash or lesion was noted [Estrogen Effect] : no estrogen effect was observed

## 2022-07-25 NOTE — COUNSELING
[FreeTextEntry1] : #1 vulvar burning/pain c/w friction marks from intercourse (if resolves with no intercourse til next visit -4-6 weeks-with estrace 1/4 gm 3x week at bedtime-if not will pursue further-to use on finger only not inserted-segundo next vist-also lubrication when resumes intercourse (2-3 weeks); cont vagifem-(mammo neg 2018 and breast sono) see dr bert whitley (br surgeon) and radiologist (butch) and d/cd by dr hodge (med onc)-diagnosed br ca 2010 and TL-will d/c estrace at next visit. Rxd vagifem by Dr. Hough and has not seen her in years-will inform gyn ostroff and breast mds as well and will get note from her breast MDS that vagifem/estrace is benefit>risk; will see dr whitley next week (breast md) and get note; also triple paste to vulva in am; benefit outweighs risk for pt re estrace cream small dose, short interval being rxd for severe vulvar burning-will change to otc once vulvar irritation is improved. lubrication for intercourse advised.-today 7/30/18 feels better and spoke to Dr. Bert Whitley Breast surgeon [facility] who states she had ER+ non-metastatic breast ca and she may take topical (Vagifem) twice weekly mammo sono and breast exam by me and breast surgeon dr bert whitley normal -rpt mammo/sono 2021 rx given will see her every year (Angi seeing him july 12 2021); exam nl. Permission for vagifem given by Dr. Whitley her breast surgeon. and last saw him july 2021!\par -7/25/22: Patient's mammogram and breast ultrasound was normal this year and she recently saw Dr. Whitley her breast surgeon at [facility] and she is continue to be cleared for topical estrogen use and will see me in 6 months biannual breast exam and annual well woman visit-exam is normal today intercourse is infrequent w/o burning or pain\par #2 gyn examination is normal today urine analysis culture and cytology was sent and postvoid residual is nl - Pap and HPV normal 2021-is up-to-date and will be repeated as clinically indicated\par #3 thyroid nodule bbploty-qzkmte-kfav see dr john yearly saw her 2/21; still has cough will ck with pcp (told gerd..?)will ck last cxr ; also sees pulmonary md at [facility]-dr eleazar covington-due for visit\par #4 dexa last done 2017-osteoporosis--2.5 will ck in 2019 and decide if med-does exercise and takes d3 and calcium and will see dr chaudhary -who is following her-repeat bone density stable-penic rpt 2022 stable-rpt 2024\par #5 colo 2018 pending DR Henriquez normal no polyp (Polyp x 1 in past) rpt 2023 or per dr henriquez\par #6 sono pelvis normal 2021 will be repeated this year_____________re ordered again 7/25/22\par #7 affirm (due to vag d/c) negative 2018 evidence of discharge or infection today___no d/c or infection\par #8 pcp twyla irwin all up to date with vaccines flu, pneumovax and shingles vaccine; COVID vaccines x4 and all other vaccines are up to date and patient is seeing her PCP at appropriate intervals\par #9 all ques ans to pt apparent satisfaction\par #10 ptns for oab component of udfr/oz from pop; completed. doing better since her back surgery (this is in all likelihood due to the relief compression in the lumbar (l3-4) spine and the ability of the loop 2 afferent/effernt nerves to be under the control of the CNS once again and also to allow the sympathetic T10-L2 area to function better autonomically to allow filling to take place without increasing overactivity parasympathetically); other genitourinary symptoms-doing ok w/o leak most days\par #11 pap, hpv taken 6/28/21 normal rpt as clinically indicated\par #12 taking synthroid w/dr chaudhary also see above is currently on 50 mcg/day; will see her 2022\par #13 bi annual booked for winter 2023\par #14 has 'M' spike in blood and sees dr Knott q6 mos to ck heme/onc re marker for multiple myeloma (3/25) pending 9/19 repeat labs went down at the last visit and she will be followed by Dr. Nikos matos seeing him again recently and yearly now (# went to 1.46 sl >; seeing him 10/24/22\par #15 pt having eye issues ref to Dr. Derian Correia if she needs another opinion but doing better today after Dr. 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

## 2022-07-26 LAB
APPEARANCE: CLEAR
BACTERIA: NEGATIVE
BILIRUBIN URINE: NEGATIVE
BLOOD URINE: NEGATIVE
COLOR: NORMAL
GLUCOSE QUALITATIVE U: NEGATIVE
HYALINE CASTS: 1 /LPF
KETONES URINE: NEGATIVE
LEUKOCYTE ESTERASE URINE: NEGATIVE
MICROSCOPIC-UA: NORMAL
NITRITE URINE: NEGATIVE
PH URINE: 6.5
PROTEIN URINE: NEGATIVE
RED BLOOD CELLS URINE: 1 /HPF
SPECIFIC GRAVITY URINE: 1.01
SQUAMOUS EPITHELIAL CELLS: 1 /HPF
URINE CYTOLOGY: NORMAL
UROBILINOGEN URINE: NORMAL
WHITE BLOOD CELLS URINE: 0 /HPF

## 2022-07-27 LAB — BACTERIA UR CULT: NORMAL

## 2022-08-02 RX ORDER — ESTRADIOL 10 UG/1
10 TABLET, FILM COATED VAGINAL
Qty: 24 | Refills: 2 | Status: ACTIVE | COMMUNITY
Start: 2022-08-02 | End: 1900-01-01

## 2023-01-09 ENCOUNTER — APPOINTMENT (OUTPATIENT)
Dept: OBGYN | Facility: CLINIC | Age: 78
End: 2023-01-09
Payer: MEDICARE

## 2023-01-09 VITALS
DIASTOLIC BLOOD PRESSURE: 79 MMHG | HEART RATE: 88 BPM | BODY MASS INDEX: 22.88 KG/M2 | WEIGHT: 134 LBS | HEIGHT: 64 IN | SYSTOLIC BLOOD PRESSURE: 132 MMHG

## 2023-01-09 PROCEDURE — 99213 OFFICE O/P EST LOW 20 MIN: CPT

## 2023-01-09 NOTE — COUNSELING
[FreeTextEntry1] : #1 vulvar burning/pain c/w friction marks from intercourse (if resolves with no intercourse til next visit -4-6 weeks-with estrace 1/4 gm 3x week at bedtime-if not will pursue further-to use on finger only not inserted-segundo next vist-also lubrication when resumes intercourse (2-3 weeks); cont vagifem-(mammo neg 2018 and breast sono) see dr bert whitley (br surgeon) and radiologist (butch) and d/cd by dr hodge (med onc)-diagnosed br ca 2010 and TL-will d/c estrace at next visit. Rxd vagifem by Dr. Hough and has not seen her in years-will inform gyn ostroff and breast mds as well and will get note from her breast MDS that vagifem/estrace is benefit>risk; will see dr whitley next week (breast md) and get note; also triple paste to vulva in am; benefit outweighs risk for pt re estrace cream small dose, short interval being rxd for severe vulvar burning-will change to otc once vulvar irritation is improved. lubrication for intercourse advised.-today 7/30/18 feels better and spoke to Dr. Bert Whitley Breast surgeon [facility] who states she had ER+ non-metastatic breast ca and she may take topical (Vagifem) twice weekly mammo sono and breast exam by me and breast surgeon dr bert whitley normal -rpt mammo/sono 2021 rx given will see her every year (Angi seeing him july 12 2021); exam nl. Permission for vagifem given by Dr. Whitley her breast surgeon. and last saw him july 2021!\par -7/25/22: Patient's mammogram and breast ultrasound was normal this year and she recently saw Dr. Whitley her breast surgeon at [facility] and she is continue to be cleared for topical estrogen use and will see me in 6 months biannual breast exam and annual well woman visit-exam is normal today intercourse is infrequent w/o burning or pain\par -1/9/23: PT MAMMO/SONO DUE THIS SPRING RX BEING SENT-EXAM NL TODAY-WILL SEE DR WHITLEY JULY 2023\par #2 gyn examination is normal today urine analysis culture and cytology was sent and postvoid residual is nl - Pap and HPV normal 2021-is up-to-date and will be repeated as clinically indicated; RPT AT [facility]\par #3 thyroid nodule bbkufje-rzrlvn-yahh see dr john yearly saw her 2/21; WILL SEE HER THIS YEAR still has cough will ck with pcp (told gerd..?)will ck last cxr ; also sees pulmonary md [facility] at [facility]-dr eleazar covington-due for visit!!!\par #4 dexa last done 2017-osteoporosis--2.5 will ck in 2019 and decide if med-does exercise and takes d3 and calcium and will see dr chaudhary -who is following her-repeat bone density stable-penic rpt 2022 stable-rpt 2024\par #5 colo 2018 pending DR Henriquez normal no polyp (Polyp x 1 in past) rpt 2024 OR or per dr henriquez\par #6 sono pelvis normal 2021 will be repeated this year 2023 NL 2022\par #7 affirm (due to vag d/c) negative 2018 evidence of discharge or infection today_no d/c or infection\par #8 pcp twyla irwin all up to date with vaccines flu, pneumovax and shingles vaccine; COVID vaccines x4 and all other vaccines are up to date and patient is seeing her PCP at appropriate intervals\par #9 all ques ans to pt apparent satisfaction\par #10 ptns for oab component of udfr/oz from pop; completed. doing better since her back surgery (this is in all likelihood due to the relief compression in the lumbar (l3-4) spine and the ability of the loop 2 afferent/effernt nerves to be under the control of the CNS once again and also to allow the sympathetic T10-L2 area to function better autonomically to allow filling to take place without increasing overactivity parasympathetically); other genitourinary symptoms-doing ok w/o leak most days-gsui w/cough will investgate cough w/ dr MENDOZA and consider pt pfme or use impressa in meantime\par #11 pap, hpv taken 6/28/21 normal rpt as clinically indicated [facility] 2023\par #12 taking synthroid w/dr chaudhary also see above is currently on 50 mcg/day; will see her 2022\par #13 bi annual booked for winter 2023\par #14 has 'M' spike in blood and sees dr Knott q6 mos to ck heme/onc re marker for multiple myeloma (3/25) pending 9/19 repeat labs went down at the last visit and she will be followed by Dr. Nikos matos seeing him again recently and yearly now (# went to 1.46 sl >; seeing him 10/24/22\par #15 pt having eye issues ref to Dr. Derian Correia if she needs another opinion but doing better today after Dr. 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

## 2023-01-09 NOTE — PHYSICAL EXAM
[Chaperone Present] : A chaperone was present in the examining room during all aspects of the physical examination [No Acute Distress] : in no acute distress [Well developed] : well developed [Well Nourished] : ~L well nourished [Good Hygeine] : demonstrates good hygeine [Oriented x3] : oriented to person, place, and time [Normal Memory] : ~T memory was ~L unimpaired [Normal Mood/Affect] : mood and affect are normal [Normal Lung Sounds] : the lungs were clear to auscultation [Respirations regular] : ~T respiratory rate was regular [Rate & Rhythm Regular] : ~T heart rate and rhythm were normal [No Edema] : ~T edema was not present [Supple] : ~T the neck demonstrated no ~M decrease in suppleness [Thyroid Normal] : the thyroid ~T showed no abnormalities [Symmetrical] : the neck was ~L symmetrical [Warm and Dry] : was warm and dry to touch [Turgor Normal] : skin turgor ~T was normal [Normal Gait] : gait was normal [No Joint Swelling] : there was no joint swelling [No Clubbing, Cyanosis] : no clubbing or cyanosis of the fingernails [Normal Strength/Tone] : muscle strength and tone were normal [Vulvar Atrophy] : vulvar atrophy [Vulvar Scarring] : vulvar scarring [Labia Majora] : were normal [Labia Majora Erythema] : erythema [Labia Minora] : were normal [Labia Minora Erythema] : erythema [Bartholin's Gland] : both Bartholin's glands were normal  [Normal Appearance] : general appearance was normal [Atrophy] : atrophy [4] : 4 [Aa ____] : Aa [unfilled] [Ba ____] : Ba [unfilled] [C ____] : C [unfilled] [GH ____] : GH [unfilled] [PB ____] : PB [unfilled] [TVL ____] : TVL  [unfilled] [Ap ____] : Ap [unfilled] [Bp ____] : Bp [unfilled] [D ____] : D [unfilled] [] : I [Uterine Adnexae] : were not tender and not enlarged [Normal rectal exam] : was normal [Normal] : was normal [None] : no [FreeTextEntry1] : exam/ck up; on vagifem (cleared by breast surgeon) [Mass] : no breast mass [Tender] : no tenderness [Nipple Discharge] : no nipple discharge [Mass (___ Cm)] : no ~M [unfilled] abdominal mass was palpated [Tenderness] : ~T no ~M abdominal tenderness observed [H/Smegaly] : no hepatosplenomegaly [Supraclavicular LAD] : no adenopathy noted in supraclavicular lymph nodes [Axillary LAD] : no adenopathy was noted in axillary nodes [Inguinal LAD] : no adenopathy was noted in the inguinal lymph nodes [Rash/Lesion] : no rash or lesion was noted [Estrogen Effect] : no estrogen effect was observed

## 2023-01-10 LAB
APPEARANCE: CLEAR
BACTERIA: NEGATIVE
BILIRUBIN URINE: NEGATIVE
BLOOD URINE: NEGATIVE
COLOR: YELLOW
GLUCOSE QUALITATIVE U: NEGATIVE
HYALINE CASTS: 0 /LPF
KETONES URINE: NEGATIVE
LEUKOCYTE ESTERASE URINE: NEGATIVE
MICROSCOPIC-UA: NORMAL
NITRITE URINE: NEGATIVE
PH URINE: 5.5
PROTEIN URINE: NORMAL
RED BLOOD CELLS URINE: 2 /HPF
SPECIFIC GRAVITY URINE: 1.03
SQUAMOUS EPITHELIAL CELLS: 1 /HPF
URINE CYTOLOGY: NORMAL
UROBILINOGEN URINE: NORMAL
WHITE BLOOD CELLS URINE: 1 /HPF

## 2023-01-11 LAB — BACTERIA UR CULT: NORMAL

## 2023-04-13 RX ORDER — ESTRADIOL 10 UG/1
10 TABLET, FILM COATED VAGINAL
Qty: 24 | Refills: 10 | Status: ACTIVE | COMMUNITY
Start: 2019-02-04

## 2023-04-14 ENCOUNTER — NON-APPOINTMENT (OUTPATIENT)
Age: 78
End: 2023-04-14

## 2023-04-24 ENCOUNTER — NON-APPOINTMENT (OUTPATIENT)
Age: 78
End: 2023-04-24

## 2023-05-02 ENCOUNTER — NON-APPOINTMENT (OUTPATIENT)
Age: 78
End: 2023-05-02

## 2023-06-16 ENCOUNTER — NON-APPOINTMENT (OUTPATIENT)
Age: 78
End: 2023-06-16

## 2023-07-31 ENCOUNTER — APPOINTMENT (OUTPATIENT)
Dept: OBGYN | Facility: CLINIC | Age: 78
End: 2023-07-31
Payer: MEDICARE

## 2023-07-31 ENCOUNTER — NON-APPOINTMENT (OUTPATIENT)
Age: 78
End: 2023-07-31

## 2023-07-31 VITALS
SYSTOLIC BLOOD PRESSURE: 138 MMHG | BODY MASS INDEX: 23.22 KG/M2 | WEIGHT: 136 LBS | HEART RATE: 82 BPM | HEIGHT: 64 IN | DIASTOLIC BLOOD PRESSURE: 81 MMHG

## 2023-07-31 DIAGNOSIS — R92.2 INCONCLUSIVE MAMMOGRAM: ICD-10-CM

## 2023-07-31 PROCEDURE — 99213 OFFICE O/P EST LOW 20 MIN: CPT

## 2023-07-31 RX ORDER — ESTRADIOL 10 UG/1
10 TABLET, FILM COATED VAGINAL
Qty: 2 | Refills: 3 | Status: ACTIVE | COMMUNITY
Start: 2023-07-31 | End: 1900-01-01

## 2023-07-31 NOTE — PHYSICAL EXAM
[FreeTextEntry1] : exam/ck up; on vagifem (cleared by breast surgeon) [Mass] : no breast mass [Tender] : no tenderness [Nipple Discharge] : no nipple discharge [Mass (___ Cm)] : no ~M [unfilled] abdominal mass was palpated [Tenderness] : ~T no ~M abdominal tenderness observed [H/Smegaly] : no hepatosplenomegaly [Supraclavicular LAD] : no adenopathy noted in supraclavicular lymph nodes [Axillary LAD] : no adenopathy was noted in axillary nodes [Inguinal LAD] : no adenopathy was noted in the inguinal lymph nodes [Rash/Lesion] : no rash or lesion was noted [Estrogen Effect] : no estrogen effect was observed [] : I

## 2023-07-31 NOTE — COUNSELING
[FreeTextEntry1] : #1 vulvar burning/pain c/w friction marks from intercourse (if resolves with no intercourse til next visit -4-6 weeks-with estrace 1/4 gm 3x week at bedtime-if not will pursue further-to use on finger only not inserted-segundo next vist-also lubrication when resumes intercourse (2-3 weeks); cont vagifem-(mammo neg 2018 and breast sono) see dr bert whitley (br surgeon) and radiologist (butch) and d/cd by dr hodge (med onc)-diagnosed br ca 2010 and TL-will d/c estrace at next visit. Rxd vagifem by Dr. Hough and has not seen her in years-will inform gyn ostroff and breast mds as well and will get note from her breast MDS that vagifem/estrace is benefit>risk; will see dr whitley next week (breast md) and get note; also triple paste to vulva in am; benefit outweighs risk for pt re estrace cream small dose, short interval being rxd for severe vulvar burning-will change to otc once vulvar irritation is improved. lubrication for intercourse advised.-today 7/30/18 feels better and spoke to Dr. Bert Whitley Breast surgeon [facility] who states she had ER+ non-metastatic breast ca and she may take topical (Vagifem) twice weekly mammo sono and breast exam by me and breast surgeon dr bert whitley normal -rpt mammo/sono 2021 rx given will see her every year (Angi seeing him july 12 2021); exam nl. Permission for vagifem given by Dr. Whitley her breast surgeon. and last saw him july 2021! -7/25/22: Patient's mammogram and breast ultrasound was normal this year and she recently saw Dr. Whitley her breast surgeon at [facility] and she is continue to be cleared for topical estrogen use and will see me in 6 months biannual breast exam and annual well woman visit-exam is normal today intercourse is infrequent w/o burning or pain -7/31/23: normal exam today-vagifem refilled, pap/hpv taken-saw dr whitley july 2023 discharged from care (br surgeon)-13  years since breast ca diagnosis-mammo/sono normal 2023-will refer to Dr. Jena Paiz also sees DR Knott heme/onc for > monoclonal M spike #2 gyn examination is normal today urine analysis culture and cytology was sent and postvoid residual is nl - Pap and HPV normal 2021-is up-to-date and will be repeated as clinically indicated; RPT AT [facility] #3 thyroid nodule cwddppn-klzctt-dlik see dr john yearly saw her 2023; WILL SEE HER THIS YEARLY still has cough will ck with pcp (told gerd..?)will ck last cxr ; also sees pulmonary md [facility] at [facility]-dr eleazar covington-sees him regularly-ugi this fall #4 dexa last done 2017-osteoporosis--2.5 will ck in 2019 and decide if med-does exercise and takes d3 and calcium and will see dr chaudhary -who is following her-repeat bone density stable-penic rpt 2022 stable-rpt 2024 (sl worse each year) she was rec to take prolia-with endo-will d/w dr john #5 colo 2018 pending DR Henriquez normal no polyp (Polyp x 1 in past) rpt 2024 OR or per dr henriquez-cologard neg per pcp 'does not need colonoscopy if cologard neg' #6 sono pelvis normal 2021 will be repeated this year 2023 NL 2023 #7 affirm (due to vag d/c) negative 2018 evidence of discharge or infection today no d/c or infection  #8 pcp twyla irwin all up to date with vaccines flu, pneumovax and shingles vaccine; COVID vaccines x4 and all other vaccines are up to date and patient is seeing her PCP at appropriate intervals #9 all ques ans to pt apparent satisfaction #10 ptns for oab component of udfr/oz from pop; completed. doing better since her back surgery (this is in all likelihood due to the relief compression in the lumbar (l3-4) spine and the ability of the loop 2 afferent/effernt nerves to be under the control of the CNS once again and also to allow the sympathetic T10-L2 area to function better autonomically to allow filling to take place without increasing overactivity parasympathetically); other genitourinary symptoms-doing ok w/o leak most days-gsui w/cough will investgate cough w/ dr MENDOZA and consider pt pfme or use impressa in meantime 7/31/23 #11 pap, hpv taken 6/28/21 normal rpt as clinically indicated [facility] 2023 sent #12 taking synthroid w/dr chaudhary also see above is currently on 50 mcg/day; will see her 2023 and 2024 #13 bi annual booked for winter 2023-24 #14 has 'M' spike in blood and sees dr Knott q6 mos to ck heme/onc re marker for multiple myeloma (3/25) pending 9/19 repeat labs went down at the last visit and she will be followed by Dr. Nikos matos seeing him again recently and yearly now (# went to 1.46 sl >; seeing him fall 10/23 #15 pt having eye issues ref to Dr. Derian Correia if she needs another opinion but doing better today after Dr. 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

## 2023-08-01 LAB
APPEARANCE: CLEAR
BACTERIA: NEGATIVE /HPF
BILIRUBIN URINE: NEGATIVE
BLOOD URINE: NEGATIVE
CAST: 0 /LPF
COLOR: YELLOW
EPITHELIAL CELLS: 3 /HPF
GLUCOSE QUALITATIVE U: NEGATIVE MG/DL
HPV HIGH+LOW RISK DNA PNL CVX: NOT DETECTED
KETONES URINE: NEGATIVE MG/DL
LEUKOCYTE ESTERASE URINE: NEGATIVE
MICROSCOPIC-UA: NORMAL
NITRITE URINE: NEGATIVE
PH URINE: 5.5
PROTEIN URINE: NEGATIVE MG/DL
RED BLOOD CELLS URINE: 0 /HPF
SPECIFIC GRAVITY URINE: 1.02
URINE CYTOLOGY: NORMAL
UROBILINOGEN URINE: 0.2 MG/DL
WHITE BLOOD CELLS URINE: 0 /HPF

## 2023-08-02 LAB — BACTERIA UR CULT: NORMAL

## 2023-08-03 LAB — CYTOLOGY CVX/VAG DOC THIN PREP: NORMAL

## 2024-01-29 ENCOUNTER — NON-APPOINTMENT (OUTPATIENT)
Age: 79
End: 2024-01-29

## 2024-01-29 ENCOUNTER — APPOINTMENT (OUTPATIENT)
Dept: OBGYN | Facility: CLINIC | Age: 79
End: 2024-01-29
Payer: MEDICARE

## 2024-01-29 VITALS
WEIGHT: 134 LBS | SYSTOLIC BLOOD PRESSURE: 150 MMHG | HEIGHT: 64 IN | HEART RATE: 83 BPM | DIASTOLIC BLOOD PRESSURE: 73 MMHG | BODY MASS INDEX: 22.88 KG/M2

## 2024-01-29 VITALS — DIASTOLIC BLOOD PRESSURE: 70 MMHG | SYSTOLIC BLOOD PRESSURE: 120 MMHG

## 2024-01-29 DIAGNOSIS — N32.81 OVERACTIVE BLADDER: ICD-10-CM

## 2024-01-29 DIAGNOSIS — N39.3 STRESS INCONTINENCE (FEMALE) (MALE): ICD-10-CM

## 2024-01-29 DIAGNOSIS — N81.9 FEMALE GENITAL PROLAPSE, UNSPECIFIED: ICD-10-CM

## 2024-01-29 DIAGNOSIS — N95.2 POSTMENOPAUSAL ATROPHIC VAGINITIS: ICD-10-CM

## 2024-01-29 PROCEDURE — 99213 OFFICE O/P EST LOW 20 MIN: CPT

## 2024-01-29 RX ORDER — ESTRADIOL 10 UG/1
10 TABLET, FILM COATED VAGINAL
Qty: 1 | Refills: 11 | Status: ACTIVE | COMMUNITY
Start: 2020-02-28 | End: 1900-01-01

## 2024-01-29 RX ORDER — ESTRADIOL 10 UG/1
10 TABLET, FILM COATED VAGINAL
Qty: 24 | Refills: 0 | Status: ACTIVE | COMMUNITY
Start: 2024-01-29 | End: 1900-01-01

## 2024-01-29 NOTE — COUNSELING
[FreeTextEntry1] : #1 vulvar burning/pain c/w friction marks from intercourse (if resolves with no intercourse til next visit -4-6 weeks-with estrace 1/4 gm 3x week at bedtime-if not will pursue further-to use on finger only not inserted-segundo next vist-also lubrication when resumes intercourse (2-3 weeks); cont vagifem-(mammo neg 2018 and breast sono) see dr bert whitley (br surgeon) and radiologist (butch) and d/cd by dr hodge (med onc)-diagnosed br ca 2010 and TL-will d/c estrace at next visit. Rxd vagifem by Dr. Hough and has not seen her in years-will inform gyn ostroff and breast mds as well and will get note from her breast MDS that vagifem/estrace is benefit>risk; will see dr whitley next week (breast md) and get note; also triple paste to vulva in am; benefit outweighs risk for pt re estrace cream small dose, short interval being rxd for severe vulvar burning-will change to otc once vulvar irritation is improved. lubrication for intercourse advised.-today 7/30/18 feels better and spoke to Dr. Bert Whitley Breast surgeon [facility] who states she had ER+ non-metastatic breast ca and she may take topical (Vagifem) twice weekly mammo sono and breast exam by me and breast surgeon dr bert whitley normal -rpt mammo/sono 2021 rx given will see her every year (Angi seeing him july 12 2021); exam nl. Permission for vagifem given by Dr. Whitley her breast surgeon. and last saw him july 2021! -7/25/22: Patient's mammogram and breast ultrasound was normal this year and she recently saw Dr. Whitley her breast surgeon at [facility] and she is continue to be cleared for topical estrogen use and will see me in 6 months biannual breast exam and annual well woman visit-exam is normal today intercourse is infrequent w/o burning or pain -7/31/23: normal exam today-vagifem refilled, pap/hpv taken-saw dr whitley july 2023 discharged from care (br surgeon)-13  years since breast ca diagnosis-mammo/sono normal 2023-will refer to Dr. Jena Matute also sees DR Knott heme/onc for > monoclonal M spike -1/29/2024-normal exam today, vagifem refilled, pap/hpv negative 2023, dr whitley nl visit/exam july 2023; 14 years since br ca dx (may 2024), suggested she now see jena matute yearly; sees dr knott yearly, monoclonal M spike stable; has oct visit 2024 #2 gyn examination is normal today urine analysis culture and cytology was sent and postvoid residual is nl - #3 thyroid nodule ubamxwg-ccrier-byfb see dr john yearly saw her 2023; WILL SEE HER THIS YEARLY still has cough will ck with pcp (told gerd..?)will ck last cxr ; also sees pulmonary md [facility] at [facility]-dr eleazar covington-sees him regularly-ugi this fall-dr wong normal 2023 #4 dexa last done 2017-osteoporosis--2.5 will ck in 2019 and decide if med-does exercise and takes d3 and calcium and will see dr chaudhary -who is following her-repeat bone density stable-penic rpt 2022 stable-rpt 2024 (sl worse each year) she was rec to take prolia-with endo-will d/w dr chaudhary dexa 2023 rpt 2025-has not taken prolia yet 'not ready' #5 colo 2018 pending DR Henriquez normal no polyp (Polyp x 1 in past) rpt 2024 OR or per dr henriquez-cologard neg per pcp 'does not need colonoscopy if cologard neg' (it was neg per pt) #6 sono pelvis normal 2021 will be repeated this year 2023 NL 2023 rpt 2024 #7 affirm (due to vag d/c) negative 2018 evidence of discharge or infection today no d/c or infection 1/29/24 #8 pcp twyla irwin all up to date with vaccines flu, pneumovax and shingles vaccine; COVID vaccines x4 and all other vaccines are up to date and patient is seeing her PCP at appropriate intervals #9 all ques ans to pt apparent satisfaction #10 ptns for oab component of udfr/oz from pop; completed. doing better since her back surgery (this is in all likelihood due to the relief compression in the lumbar (l3-4) spine and the ability of the loop 2 afferent/effernt nerves to be under the control of the CNS once again and also to allow the sympathetic T10-L2 area to function better autonomically to allow filling to take place without increasing overactivity parasympathetically); other genitourinary symptoms-doing ok w/o leak most days-gsui w/cough will investgate cough w/ dr MENDOZA and consider pt pfme or use impressa in meantime 1/29/24 #11 pap, hpv taken 6/28/21 normal rpt as clinically indicated [facility] 2023 normal  #12 taking synthroid w/dr chaudhary also see above is currently on 50 mcg/day; will see her in 2024 #13 bi annual booked for summer 2024 #14 has 'M' spike in blood and sees dr Knott q6 mos to ck heme/onc re marker for multiple myeloma (3/25) pending 9/19 repeat labs went down at the last visit and she will be followed by Dr. Nikos matos seeing him again recently and yearly now (# went to 1.46 sl >; seeing him fall 10/23 #15 pt having eye issues ref to Dr. Derian Correia if she needs another opinion but doing better today after Dr. 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

## 2024-01-29 NOTE — PHYSICAL EXAM
[Chaperone Present] : A chaperone was present in the examining room during all aspects of the physical examination [FreeTextEntry1] : exam/ck up; on vagifem (cleared by breast surgeon) [Mass] : no breast mass [Tender] : no tenderness [Nipple Discharge] : no nipple discharge [Mass (___ Cm)] : no ~M [unfilled] abdominal mass was palpated [Tenderness] : ~T no ~M abdominal tenderness observed [H/Smegaly] : no hepatosplenomegaly [Supraclavicular LAD] : no adenopathy noted in supraclavicular lymph nodes [Axillary LAD] : no adenopathy was noted in axillary nodes [Inguinal LAD] : no adenopathy was noted in the inguinal lymph nodes [Rash/Lesion] : no rash or lesion was noted [Estrogen Effect] : no estrogen effect was observed

## 2024-01-30 LAB
APPEARANCE: CLEAR
BACTERIA: ABNORMAL /HPF
BILIRUBIN URINE: NEGATIVE
BLOOD URINE: NEGATIVE
CAST: 0 /LPF
COLOR: YELLOW
EPITHELIAL CELLS: 5 /HPF
GLUCOSE QUALITATIVE U: NEGATIVE MG/DL
KETONES URINE: NEGATIVE MG/DL
LEUKOCYTE ESTERASE URINE: NEGATIVE
MICROSCOPIC-UA: NORMAL
NITRITE URINE: NEGATIVE
PH URINE: 8
PROTEIN URINE: NEGATIVE MG/DL
RED BLOOD CELLS URINE: 1 /HPF
SPECIFIC GRAVITY URINE: 1.02
URINE CYTOLOGY: NORMAL
UROBILINOGEN URINE: 0.2 MG/DL
WHITE BLOOD CELLS URINE: 0 /HPF

## 2024-01-31 LAB — BACTERIA UR CULT: NORMAL

## 2024-01-31 RX ORDER — ESTRADIOL 10 UG/1
10 INSERT VAGINAL
Qty: 1 | Refills: 11 | Status: ACTIVE | COMMUNITY
Start: 2022-02-02 | End: 1900-01-01

## 2024-02-07 NOTE — COUNSELING
[FreeTextEntry1] : #1 vulvar burning/pain c/w friction marks from intercourse (if resolves with no intercourse til next visit -4-6 weeks-with estrace 1/4 gm 3x week at bedtime-if not will pursue further-to use on finger only not inserted-segundo next vist-also lubrication when resumes intercourse (2-3 weeks); cont vagifem-(mammo neg 2018 and breast sono) see dr bert whitley (br surgeon) and radiologist (butch) and d/cd by dr hodge (med onc)-diagnosed br ca 2010 and TL-will d/c estrace at next visit. Rxd vagifem by Dr. Hough and has not seen her in years-will inform gyn ostroff and breast mds as well and will get note from her breast MDS that vagifem/estrace is benefit>risk; will see dr whitley next week (breast md) and get note; also triple paste to vulva in am; benefit outweighs risk for pt re estrace cream small dose, short interval being rxd for severe vulvar burning-will change to otc once vulvar irritation is improved. lubrication for intercourse advised.-today 7/30/18 feels better and spoke to Dr. Bert Whitley Breast surgeon [facility] who states she had ER+ non-metastatic breast ca and she may take topical (Vagifem) twice weekly mammo sono and breast exam by me and breast surgeon dr bert whitley normal -rpt mammo/sono 2021 rx given will see her every year (Angi seeing him july 12 2021); exam nl. Permission for vagifem given by Dr. Whitley her breast surgeon.\par #2 gyn Dr. Neeru Barrera-up to date with exams seeing her again next year-RECORDS REQ NOT REC'D: I will service the patient's GYN for now and for the foreseeable future-exam normal today breast and pelvic 6/28/21; pvr nl, ua, ucytol and uculture. Seeinig me for gyn now.\par #3 thyroid nodule xtxkiix-dkslbj-uakc see dr john yearly saw her 2/21; still has cough will ck with pcp (told gerd..?)will ck last cxr ; also sees pulmonary md at [facility]-dr peter dicpiniagatis-I did gyn exam 6/28/21 and pap and hpv done- and normal GYN exam today 6/28/21\par #4 dexa last done 2017-osteoporosis--2.5 will ck in 2019 and decide if med-does exercise and takes d3 and calcium and will see dr chaudhary -who is following her-repeat bone density stable-penic rpt 2022\par #5 colo 2018 pending DR Henriquez normal no polyp (Polyp x 1 in past) rpt 2023 or per dr henriquez\par #6 sono pelvis normal 2020 rpt 2021\par #7 affirm (due to vag d/c) negative 2018 no d/c 6/28/21\par #8 pcp twyla irwin all up to date with vaccines flu, pneumovax and shingles vaccine seeing her 7/12/21\par #9 all ques ans to pt apparent satisfaction\par #10 ptns for oab component of udfr/oz from pop; completed. doing better since her back surgery (this is in all likelihood due to the relief compression in the lumbar (l3-4) spine and the ability of the loop 2 afferent/effernt nerves to be under the control of the CNS once again and also to allow the sympathetic T10-L2 area to function better autonomically to allow filling to take place without increasing overactivity parasympathetic Sae)\par #11 pap, hpv taken 6/28/21\par #12 taking synthroid w/dr chaudhary also see above is currently on 50 mcg/day\par #13 bi annual booked for dec 2021\par #14 has 'M' spike in blood and sees dr Knott q6 mos to ck heme/onc re marker for multiple myeloma (3/25) pending 9/19 repeat labs went down at the last visit and she will be followed by Dr. Nikos matos seeing him again oct 25th yearly now.\par #15 pt having eye issues ref to Dr. Derian Correia if she needs another opinion but doing better today after Dr. 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
no

## 2024-02-27 ENCOUNTER — NON-APPOINTMENT (OUTPATIENT)
Age: 79
End: 2024-02-27

## 2024-03-14 ENCOUNTER — NON-APPOINTMENT (OUTPATIENT)
Age: 79
End: 2024-03-14

## 2024-06-27 ENCOUNTER — NON-APPOINTMENT (OUTPATIENT)
Age: 79
End: 2024-06-27

## 2024-06-29 NOTE — COUNSELING
[FreeTextEntry1] : #1 vulvar burning/pain c/w friction marks from intercourse (if resolves with no intercourse til next visit -4-6 weeks-with estrace 1/4 gm 3x week at bedtime-if not will pursue further-to use on finger only not inserted-segundo next vist-also lubrication when resumes intercourse (2-3 weeks); cont vagifem-(mammo neg 2018 and breast sono) see dr bert whitley (br surgeon) and radiologist (butch) and d/cd by dr hodge (med onc)-diagnosed br ca 2010 and TL-will d/c estrace at next visit. Rxd vagifem by Dr. Hough and has not seen her in years-will inform gyn ostroff and breast mds as well and will get note from her breast MDS that vagifem/estrace is benefit>risk; will see dr whitley next week (breast md) and get note; also triple paste to vulva in am; benefit outweighs risk for pt re estrace cream small dose, short interval being rxd for severe vulvar burning-will change to otc once vulvar irritation is improved. lubrication for intercourse advised.-today 7/30/18 feels better and spoke to Dr. Bert Whitley Breast surgeon [facility] who states she had ER+ non-metastatic breast ca and she may take topical (Vagifem) twice weekly mammo sono and breast exam by me and breast surgeon dr bert whitley normal -rpt mammo/sono 2021 rx given will see her every year (Angi seeing him july 12 2021); exam nl. Permission for vagifem given by Dr. Whitley her breast surgeon. and last saw him july 2021! -7/25/22: Patient's mammogram and breast ultrasound was normal this year and she recently saw Dr. Whitley her breast surgeon at [facility] and she is continue to be cleared for topical estrogen use and will see me in 6 months biannual breast exam and annual well woman visit-exam is normal today intercourse is infrequent w/o burning or pain -7/31/23: normal exam today-vagifem refilled, pap/hpv taken-saw dr whitley july 2023 discharged from care (br surgeon)-13  years since breast ca diagnosis-mammo/sono normal 2023-will refer to Dr. Jena Matute also sees DR Knott heme/onc for > monoclonal M spike -1/29/2024-normal exam today, vagifem refilled, pap/hpv negative 2023, dr whitley nl visit/exam july 2023; 14 years since br ca dx (may 2024), suggested she now see jena matute yearly; sees dr knott yearly, monoclonal M spike stable; has oct visit 2024 #2 gyn examination is normal today urine analysis culture and cytology was sent and postvoid residual is nl - #3 thyroid nodule [id number]-[id number]-iavk see dr john yearly saw her 2023; WILL SEE HER THIS YEARLY still has cough will ck with pcp (told gerd..?)will ck last cxr ; also sees pulmonary md [facility] at [facility]-dr eleazar covington-sees him regularly-ugi this fall-dr wong normal 2023 #4 dexa last done 2017-osteoporosis--2.5 will ck in 2019 and decide if med-does exercise and takes d3 and calcium and will see dr chaudhary -who is following her-repeat bone density stable-penic rpt 2022 stable-rpt 2024 (sl worse each year) she was rec to take prolia-with endo-will d/w dr chaudhary dexa 2023 rpt 2025-has not taken prolia yet 'not ready' #5 colo 2018 pending DR Henriquez normal no polyp (Polyp x 1 in past) rpt 2024 OR or per dr henriquez-cologard neg per pcp 'does not need colonoscopy if cologard neg' (it was neg per pt) #6 sono pelvis normal 2021 will be repeated this year 2023 NL 2023 rpt 2024 #7 affirm (due to vag d/c) negative 2018 evidence of discharge or infection today no d/c or infection 1/29/24 #8 pcp twyla irwin all up to date with vaccines flu, pneumovax and shingles vaccine; COVID vaccines x4 and all other vaccines are up to date and patient is seeing her PCP at appropriate intervals #9 all ques ans to pt apparent satisfaction #10 ptns for oab component of udfr/oz from pop; completed. doing better since her back surgery (this is in all likelihood due to the relief compression in the lumbar (l3-4) spine and the ability of the loop 2 afferent/effernt nerves to be under the control of the CNS once again and also to allow the sympathetic T10-L2 area to function better autonomically to allow filling to take place without increasing overactivity parasympathetically); other genitourinary symptoms-doing ok w/o leak most days-gsui w/cough will investgate cough w/ dr MENDOZA and consider pt pfme or use impressa in meantime 1/29/24 #11 pap, hpv taken 6/28/21 normal rpt as clinically indicated nv 2023 normal  #12 taking synthroid w/dr chaudhary also see above is currently on 50 mcg/day; will see her in 2024 #13 bi annual booked for summer 2024 #14 has 'M' spike in blood and sees dr Knott q6 mos to ck heme/onc re marker for multiple myeloma (3/25) pending 9/19 repeat labs went down at the last visit and she will be followed by Dr. iNkos matos seeing him again recently and yearly now (# went to 1.46 sl >; seeing him fall 10/23 #15 pt having eye issues ref to Dr. Derian Correia if she needs another opinion but doing better today after Dr. 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

## 2024-06-29 NOTE — PHYSICAL EXAM
[Chaperone Present] : A chaperone was present in the examining room during all aspects of the physical examination [FreeTextEntry1] : exam/ck up; on vagifem (cleared by breast surgeon) [No Acute Distress] : in no acute distress [Well developed] : well developed [Well Nourished] : ~L well nourished [Good Hygeine] : demonstrates good hygeine [Oriented x3] : oriented to person, place, and time [Normal Memory] : ~T memory was ~L unimpaired [Normal Mood/Affect] : mood and affect are normal [Normal Lung Sounds] : the lungs were clear to auscultation [Respirations regular] : ~T respiratory rate was regular [Rate & Rhythm Regular] : ~T heart rate and rhythm were normal [No Edema] : ~T edema was not present [Supple] : ~T the neck demonstrated no ~M decrease in suppleness [Thyroid Normal] : the thyroid ~T showed no abnormalities [Symmetrical] : the neck was ~L symmetrical [Mass] : no breast mass [Tender] : no tenderness [Nipple Discharge] : no nipple discharge [Mass (___ Cm)] : no ~M [unfilled] abdominal mass was palpated [Tenderness] : ~T no ~M abdominal tenderness observed [H/Smegaly] : no hepatosplenomegaly [Supraclavicular LAD] : no adenopathy noted in supraclavicular lymph nodes [Axillary LAD] : no adenopathy was noted in axillary nodes [Inguinal LAD] : no adenopathy was noted in the inguinal lymph nodes [Warm and Dry] : was warm and dry to touch [Turgor Normal] : skin turgor ~T was normal [Rash/Lesion] : no rash or lesion was noted [Normal Gait] : gait was normal [No Joint Swelling] : there was no joint swelling [No Clubbing, Cyanosis] : no clubbing or cyanosis of the fingernails [Normal Strength/Tone] : muscle strength and tone were normal [Vulvar Atrophy] : vulvar atrophy [Vulvar Scarring] : vulvar scarring [Labia Majora] : were normal [Labia Majora Erythema] : erythema [Labia Minora Erythema] : erythema [Labia Minora] : were normal [Bartholin's Gland] : both Bartholin's glands were normal  [Normal Appearance] : general appearance was normal [Atrophy] : atrophy [Estrogen Effect] : no estrogen effect was observed [4] : 4 [Aa ____] : Aa [unfilled] [Ba ____] : Ba [unfilled] [C ____] : C [unfilled] [GH ____] : GH [unfilled] [PB ____] : PB [unfilled] [TVL ____] : TVL  [unfilled] [Ap ____] : Ap [unfilled] [Bp ____] : Bp [unfilled] [D ____] : D [unfilled] [] : I [Uterine Adnexae] : were not tender and not enlarged [Normal rectal exam] : was normal [Normal] : was normal [None] : no

## 2024-07-23 ENCOUNTER — NON-APPOINTMENT (OUTPATIENT)
Age: 79
End: 2024-07-23

## 2024-07-24 ENCOUNTER — NON-APPOINTMENT (OUTPATIENT)
Age: 79
End: 2024-07-24

## 2024-07-29 ENCOUNTER — APPOINTMENT (OUTPATIENT)
Dept: OBGYN | Facility: CLINIC | Age: 79
End: 2024-07-29
Payer: MEDICARE

## 2024-07-29 ENCOUNTER — NON-APPOINTMENT (OUTPATIENT)
Age: 79
End: 2024-07-29

## 2024-07-29 VITALS
HEIGHT: 64 IN | HEART RATE: 80 BPM | BODY MASS INDEX: 23.56 KG/M2 | SYSTOLIC BLOOD PRESSURE: 124 MMHG | DIASTOLIC BLOOD PRESSURE: 76 MMHG | WEIGHT: 138 LBS

## 2024-07-29 DIAGNOSIS — R92.30 DENSE BREASTS, UNSPECIFIED: ICD-10-CM

## 2024-07-29 DIAGNOSIS — N95.2 POSTMENOPAUSAL ATROPHIC VAGINITIS: ICD-10-CM

## 2024-07-29 DIAGNOSIS — N32.81 OVERACTIVE BLADDER: ICD-10-CM

## 2024-07-29 DIAGNOSIS — N81.9 FEMALE GENITAL PROLAPSE, UNSPECIFIED: ICD-10-CM

## 2024-07-29 LAB
BILIRUB UR QL STRIP: NORMAL
CLARITY UR: CLEAR
COLLECTION METHOD: NORMAL
GLUCOSE UR-MCNC: NORMAL
HCG UR QL: 0.2 EU/DL
HGB UR QL STRIP.AUTO: NORMAL
KETONES UR-MCNC: NORMAL
LEUKOCYTE ESTERASE UR QL STRIP: NORMAL
NITRITE UR QL STRIP: NORMAL
PH UR STRIP: 5.5
PROT UR STRIP-MCNC: NORMAL
SP GR UR STRIP: 1.02

## 2024-07-29 PROCEDURE — 99213 OFFICE O/P EST LOW 20 MIN: CPT

## 2024-07-29 PROCEDURE — 99459 PELVIC EXAMINATION: CPT

## 2024-07-29 NOTE — COUNSELING
[FreeTextEntry1] : #1 vulvar burning/pain c/w friction marks from intercourse (if resolves with no intercourse til next visit -4-6 weeks-with estrace 1/4 gm 3x week at bedtime-if not will pursue further-to use on finger only not inserted-segundo next vist-also lubrication when resumes intercourse (2-3 weeks); cont vagifem-(mammo neg  and breast sono) see dr bert whitley (br surgeon) and radiologist (butch) and d/cd by dr hodge (med onc)-diagnosed br ca  and TL-will d/c estrace at next visit. Rxd vagifem by Dr. Hough and has not seen her in years-will inform gyn ostroff and breast mds as well and will get note from her breast MDS that vagifem/estrace is benefit>risk; will see dr whitley next week (breast md) and get note; also triple paste to vulva in am; benefit outweighs risk for pt re estrace cream small dose, short interval being rxd for severe vulvar burning-will change to otc once vulvar irritation is improved. lubrication for intercourse advised.-today 18 feels better and spoke to Dr. Bert Whitley Breast surgeon [facility] who states she had ER+ non-metastatic breast ca and she may take topical (Vagifem) twice weekly mammo sono and breast exam by me and breast surgeon dr bert whitley normal -rpt mammo/sono  rx given will see her every year (Angi seeing him 2021); exam nl. Permission for vagifem given by Dr. Whitley her breast surgeon. and last saw him 2021! -22: Patient's mammogram and breast ultrasound was normal this year and she recently saw Dr. Whitley her breast surgeon at [facility] and she is continue to be cleared for topical estrogen use and will see me in 6 months biannual breast exam and annual well woman visit-exam is normal today intercourse is infrequent w/o burning or pain -23: normal exam today-vagifem refilled, pap/hpv taken-saw dr whitley 2023 discharged from care (br surgeon)-13  years since breast ca diagnosis-mammo/sono normal -will refer to Dr. Jena Matute also sees DR Knott heme/onc for > monoclonal M spike -2024-normal exam today, vagifem refilled, pap/hpv negative , dr whitley nl visit/exam 2023; 14 years since br ca dx (may 2024), suggested she now see jena matute yearly; sees dr knott yearly, monoclonal M spike stable; has oct visit  #2 gyn examination is normal today urine analysis culture and cytology was sent and postvoid residual is nl - #3 thyroid nodule [id number]-[id number]-xlzn see dr john yearly saw her ; WILL SEE HER THIS YEARLY still has cough will ck with pcp (told gerd..?)will ck last cxr ; also sees pulmonary md [facility] at [facility]-dr eleazar covington-sees him regularly-ugi this fall-dr wong normal  #4 dexa last done -osteoporosis--2.5 will ck in 2019 and decide if med-does exercise and takes d3 and calcium and will see dr chaudhary -who is following her-repeat bone density stable-penic rpt  stable-rpt  (sl worse each year) she was rec to take prolia-with endo-will d/w dr chaudhary dexa  rpt -has not taken prolia yet 'not ready' #5 colo 2018 pending DR Henriquez normal no polyp (Polyp x 1 in past) rpt  OR or per dr henriquez-cologard neg per pcp 'does not need colonoscopy if cologard neg' (it was neg per pt) #6 sono pelvis normal  will be repeated this year  NL  rpt  #7 affirm (due to vag d/c) negative 2018 evidence of discharge or infection today no d/c or infection 24 #8 pcp twyla irwin all up to date with vaccines flu, pneumovax and shingles vaccine; COVID vaccines x4 and all other vaccines are up to date and patient is seeing her PCP at appropriate intervals #9 all ques ans to pt apparent satisfaction #10 ptns for oab component of udfr/oz from pop; completed. doing better since her back surgery (this is in all likelihood due to the relief compression in the lumbar (l3-4) spine and the ability of the loop 2 afferent/effernt nerves to be under the control of the CNS once again and also to allow the sympathetic T10-L2 area to function better autonomically to allow filling to take place without increasing overactivity parasympathetically); other genitourinary symptoms-doing ok w/o leak most days-gsui w/cough will investgate cough w/ dr MENDOZA and consider pt pfme or use impressa in meantime 24 #11 pap, hpv taken 21 normal rpt as clinically indicated nv  normal  #12 taking synthroid w/dr chaudhary also see above is currently on 50 mcg/day; will see her in  #13 bi annual booked for summer 2024 #14 has 'M' spike in blood and sees dr Knott q6 mos to ck heme/onc re marker for multiple myeloma (3/25) pending  repeat labs went down at the last visit and she will be followed by Dr. Nikos matos seeing him again recently and yearly now (# went to 1.46 sl >; seeing him fall 10/23 #15 pt having eye issues ref to Dr. Derian Correia if she needs another opinion but doing better today after Dr. 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  Breast and pelvic exam is normal today with very little evidence of vaginal atrophy and she is using estradiol vaginal tablets twice weekly and was cleared by her former breast surgeon Dr. Bert Whitley now will be seeing Dr. Jena Matute.  Mammogram and breast ultrasound were normal this month.MED ONC WAS DAVE HODGE, will see Dr. Jena Matute and if she deems that a medical oncologist is necessary she will let the patient know Of note is that her mild pelvic organ prolapse is now 95% resolved as the patient does assiduous pelvic floor muscle exercises on a very regular basis #1 vulvar burning/pain .  This is basically resolved because of topical estrogen use above and she will be getting a clearance note from Dr. Matute regarding the use of this estrogen with her history of breast cancer #2 gyn examination is normal today urine analysis culture and cytology was sent at the last visit and was normal and postvoid residual is nl -POCT is normal today and PVR is normal #3 thyroid nodule fxxvtal-hmuwwa-nhel see dr john yearly saw her ; WILL SEE HER THIS YEARLY still has cough will ck with pcp (told gerd..?)-dr wong normal  due to see him  (will discuss Cologuard with him versus colonoscopy- you had the Cologuard right okay Cologuard was negative Last year)-she also sees Dr. Alton boone for her sleep apnea and notices an improvement in coughing reduction when she changed her dental device extension for the sleep device #4 dexa last done -osteoporosis--2.5 will ck in 2019 and decide if med-does exercise and takes d3 and calcium and will see dr chaudhary -who is following her-repeat bone density stable-penic rpt  stable-rpt  (sl worse each year) she was rec to take prolia-with endo-will d/w dr chaudhary dexa  rpt -has not taken prolia yet 'not ready' #5 colo 2018 pending DR Henriquez normal no polyp (Polyp x 1 in past) rpt  OR or per dr henriquez-cologard neg per pcp 'does not need colonoscopy if cologard neg' (it was neg per pt) as above now sees Dr. Moscoso GI #6 sono pelvis normal  although the right ovary was not visualized due to bowel gas and no masses are felt on exam today #7 affirm (due to vag d/c) negative 2018 evidence of discharge or infection today 24 #8 pcp twyla irwin all up to date with vaccines flu, pneumovax and shingles vaccine; COVID vaccines x4 and all other vaccines are up to date and patient is seeing her PCP at appropriate intervals #9 all ques ans to pt apparent satisfaction #10 ptns for oab component of udfr/oz from pop; completed. doing better since her back surgery and oab/oz better unless uri/leak w/cough only w/uri #11 pap, hpv taken 21 normal rpt as clinically indicated nv  normal we will repeat this  #12 taking synthroid w/dr chaudhary also see above is currently on 50 mcg/day; will see her in  #13 bi annual booked for winter 2025 #14 has 'M' spike in blood and sees dr Knott q6 mos to ck heme/onc re marker for multiple myeloma (3/25) pending  repeat labs went down at the last visit and she will be followed by Dr. Nikos matos seeing him again recently and yearly now (# went to 1.46 sl >; seeing him fall 10/24 STABLE M SPIKE #15 pt having eye issues ref to Dr. Derian Correia if she needs another opinion but doing better today after Dr. Meng/opthalmologist rxd ('slt in cornea' has improved vision)-had vitrectomy-dr naidu spring 2021; now laser surgery  booked L eye_doing better 24 #16  All questions answered to the patient's apparent satisfaction rtc summer 2024 NIKKI

## 2024-07-29 NOTE — PHYSICAL EXAM
[55522] : A chaperone was present during the pelvic exam. [FreeTextEntry2] : sera mane [FreeTextEntry1] : exam/ck up; on vagifem (cleared by breast surgeon) needs to see ebony matute now as d/cd by bert mares  [Mass] : no breast mass [Tender] : no tenderness [Nipple Discharge] : no nipple discharge [Mass (___ Cm)] : no ~M [unfilled] abdominal mass was palpated [Tenderness] : ~T no ~M abdominal tenderness observed [H/Smegaly] : no hepatosplenomegaly [Supraclavicular LAD] : no adenopathy noted in supraclavicular lymph nodes [Axillary LAD] : no adenopathy was noted in axillary nodes [Inguinal LAD] : no adenopathy was noted in the inguinal lymph nodes [Rash/Lesion] : no rash or lesion was noted [Estrogen Effect] : no estrogen effect was observed

## 2024-11-26 ENCOUNTER — NON-APPOINTMENT (OUTPATIENT)
Age: 79
End: 2024-11-26

## 2024-12-11 ENCOUNTER — TRANSCRIPTION ENCOUNTER (OUTPATIENT)
Age: 79
End: 2024-12-11

## 2024-12-26 ENCOUNTER — NON-APPOINTMENT (OUTPATIENT)
Age: 79
End: 2024-12-26

## 2025-01-29 ENCOUNTER — APPOINTMENT (OUTPATIENT)
Dept: OBGYN | Facility: CLINIC | Age: 80
End: 2025-01-29
Payer: MEDICARE

## 2025-01-29 ENCOUNTER — NON-APPOINTMENT (OUTPATIENT)
Age: 80
End: 2025-01-29

## 2025-01-29 VITALS
WEIGHT: 138 LBS | SYSTOLIC BLOOD PRESSURE: 142 MMHG | HEIGHT: 64 IN | BODY MASS INDEX: 23.56 KG/M2 | DIASTOLIC BLOOD PRESSURE: 81 MMHG | HEART RATE: 99 BPM

## 2025-01-29 DIAGNOSIS — N95.2 POSTMENOPAUSAL ATROPHIC VAGINITIS: ICD-10-CM

## 2025-01-29 DIAGNOSIS — C50.919 MALIGNANT NEOPLASM OF UNSPECIFIED SITE OF UNSPECIFIED FEMALE BREAST: ICD-10-CM

## 2025-01-29 DIAGNOSIS — N32.81 OVERACTIVE BLADDER: ICD-10-CM

## 2025-01-29 DIAGNOSIS — N81.9 FEMALE GENITAL PROLAPSE, UNSPECIFIED: ICD-10-CM

## 2025-01-29 PROCEDURE — 99214 OFFICE O/P EST MOD 30 MIN: CPT

## 2025-01-29 PROCEDURE — 99459 PELVIC EXAMINATION: CPT

## 2025-03-04 DIAGNOSIS — Z13.820 ENCOUNTER FOR SCREENING FOR OSTEOPOROSIS: ICD-10-CM

## 2025-03-04 DIAGNOSIS — Z12.39 ENCOUNTER FOR OTHER SCREENING FOR MALIGNANT NEOPLASM OF BREAST: ICD-10-CM

## 2025-03-04 DIAGNOSIS — N32.81 OVERACTIVE BLADDER: ICD-10-CM

## 2025-03-21 ENCOUNTER — NON-APPOINTMENT (OUTPATIENT)
Age: 80
End: 2025-03-21

## 2025-06-11 ENCOUNTER — NON-APPOINTMENT (OUTPATIENT)
Age: 80
End: 2025-06-11

## 2025-07-23 ENCOUNTER — NON-APPOINTMENT (OUTPATIENT)
Age: 80
End: 2025-07-23

## 2025-07-30 ENCOUNTER — APPOINTMENT (OUTPATIENT)
Dept: OBGYN | Facility: CLINIC | Age: 80
End: 2025-07-30
Payer: MEDICARE

## 2025-07-30 ENCOUNTER — NON-APPOINTMENT (OUTPATIENT)
Age: 80
End: 2025-07-30

## 2025-07-30 DIAGNOSIS — N32.81 OVERACTIVE BLADDER: ICD-10-CM

## 2025-07-30 DIAGNOSIS — N95.2 POSTMENOPAUSAL ATROPHIC VAGINITIS: ICD-10-CM

## 2025-07-30 DIAGNOSIS — N39.3 STRESS INCONTINENCE (FEMALE) (MALE): ICD-10-CM

## 2025-07-30 DIAGNOSIS — N81.9 FEMALE GENITAL PROLAPSE, UNSPECIFIED: ICD-10-CM

## 2025-07-30 PROCEDURE — 99213 OFFICE O/P EST LOW 20 MIN: CPT

## 2025-07-30 PROCEDURE — 99459 PELVIC EXAMINATION: CPT

## 2025-07-30 RX ORDER — ESTRADIOL 10 UG/1
10 TABLET, FILM COATED VAGINAL
Refills: 0 | Status: ACTIVE | COMMUNITY
Start: 2025-07-30

## 2025-07-31 LAB
APPEARANCE: CLEAR
BACTERIA: NEGATIVE /HPF
BILIRUBIN URINE: NEGATIVE
BLOOD URINE: NEGATIVE
CAST: 0 /LPF
COLOR: YELLOW
EPITHELIAL CELLS: 0 /HPF
GLUCOSE QUALITATIVE U: NEGATIVE MG/DL
KETONES URINE: NEGATIVE MG/DL
LEUKOCYTE ESTERASE URINE: NEGATIVE
MICROSCOPIC-UA: NORMAL
NITRITE URINE: NEGATIVE
PH URINE: 6.5
PROTEIN URINE: NEGATIVE MG/DL
RED BLOOD CELLS URINE: 1 /HPF
SPECIFIC GRAVITY URINE: 1.01
UROBILINOGEN URINE: 0.2 MG/DL
WHITE BLOOD CELLS URINE: 0 /HPF

## 2025-08-01 LAB
BACTERIA UR CULT: NORMAL
HPV HIGH+LOW RISK DNA PNL CVX: NOT DETECTED
URINE CYTOLOGY: NORMAL

## 2025-08-04 ENCOUNTER — TRANSCRIPTION ENCOUNTER (OUTPATIENT)
Age: 80
End: 2025-08-04

## 2025-08-04 LAB — CYTOLOGY CVX/VAG DOC THIN PREP: NORMAL

## 2025-09-18 DIAGNOSIS — M21.171 VARUS DEFORMITY, NOT ELSEWHERE CLASSIFIED, RIGHT ANKLE: ICD-10-CM

## 2025-09-18 DIAGNOSIS — M79.2 NEURALGIA AND NEURITIS, UNSPECIFIED: ICD-10-CM

## 2025-09-18 DIAGNOSIS — Z78.9 OTHER SPECIFIED HEALTH STATUS: ICD-10-CM

## 2025-09-18 DIAGNOSIS — M21.179 VARUS DEFORMITY, NOT ELSEWHERE CLASSIFIED, UNSPECIFIED ANKLE: ICD-10-CM

## 2025-09-18 DIAGNOSIS — M54.16 RADICULOPATHY, LUMBAR REGION: ICD-10-CM

## 2025-09-18 RX ORDER — EFINACONAZOLE 100 MG/ML
SOLUTION TOPICAL
Refills: 0 | Status: ACTIVE | COMMUNITY

## 2025-09-18 RX ORDER — CHROMIUM 200 MCG
TABLET ORAL
Refills: 0 | Status: ACTIVE | COMMUNITY

## 2025-09-18 RX ORDER — YOHIMBE BARK 500 MG
CAPSULE ORAL
Refills: 0 | Status: ACTIVE | COMMUNITY

## 2025-09-18 RX ORDER — VITAMIN B COMPLEX
TABLET ORAL
Refills: 0 | Status: ACTIVE | COMMUNITY

## 2025-09-26 PROBLEM — M20.11 HALLUX VALGUS OF RIGHT FOOT: Status: ACTIVE | Noted: 2025-09-26

## 2025-09-26 PROBLEM — M20.40: Status: ACTIVE | Noted: 2025-09-18
